# Patient Record
Sex: MALE | Race: WHITE | Employment: OTHER | URBAN - METROPOLITAN AREA
[De-identification: names, ages, dates, MRNs, and addresses within clinical notes are randomized per-mention and may not be internally consistent; named-entity substitution may affect disease eponyms.]

---

## 2017-02-21 ENCOUNTER — PRE VISIT (OUTPATIENT)
Dept: LAB | Facility: CLINIC | Age: 77
End: 2017-02-21

## 2017-02-21 NOTE — TELEPHONE ENCOUNTER
Was the patient contacted by phone and reminded of the upcoming visit? message left    Was the patient instructed to bring a current list of all medications to the appointment or instructed to bring in all medication bottles? Yes     Was the patient instructed to arrive prior to the appointment time to have ordered labs drawn? Message left informing pt that lab appointment was changed to 12:30 so results would be ready at time of visit.    Were the needed lab orders placed? Yes    HODA ESTEVES CMA

## 2017-02-27 ENCOUNTER — OFFICE VISIT (OUTPATIENT)
Dept: GASTROENTEROLOGY | Facility: CLINIC | Age: 77
End: 2017-02-27
Attending: INTERNAL MEDICINE
Payer: MEDICARE

## 2017-02-27 VITALS
RESPIRATION RATE: 18 BRPM | HEART RATE: 83 BPM | DIASTOLIC BLOOD PRESSURE: 77 MMHG | WEIGHT: 204 LBS | HEIGHT: 74 IN | SYSTOLIC BLOOD PRESSURE: 135 MMHG | BODY MASS INDEX: 26.18 KG/M2 | TEMPERATURE: 97.7 F | OXYGEN SATURATION: 97 %

## 2017-02-27 DIAGNOSIS — K83.01 PRIMARY SCLEROSING CHOLANGITIS (H): Primary | ICD-10-CM

## 2017-02-27 DIAGNOSIS — K83.01 PSC (PRIMARY SCLEROSING CHOLANGITIS) (H): ICD-10-CM

## 2017-02-27 LAB
ALBUMIN SERPL-MCNC: 4 G/DL (ref 3.4–5)
ALP SERPL-CCNC: 435 U/L (ref 40–150)
ALT SERPL W P-5'-P-CCNC: 62 U/L (ref 0–70)
ANION GAP SERPL CALCULATED.3IONS-SCNC: 8 MMOL/L (ref 3–14)
AST SERPL W P-5'-P-CCNC: 47 U/L (ref 0–45)
BILIRUB DIRECT SERPL-MCNC: 0.2 MG/DL (ref 0–0.2)
BILIRUB SERPL-MCNC: 0.8 MG/DL (ref 0.2–1.3)
BUN SERPL-MCNC: 17 MG/DL (ref 7–30)
CALCIUM SERPL-MCNC: 8.8 MG/DL (ref 8.5–10.1)
CHLORIDE SERPL-SCNC: 103 MMOL/L (ref 94–109)
CO2 SERPL-SCNC: 26 MMOL/L (ref 20–32)
CREAT SERPL-MCNC: 0.92 MG/DL (ref 0.66–1.25)
ERYTHROCYTE [DISTWIDTH] IN BLOOD BY AUTOMATED COUNT: 12.5 % (ref 10–15)
GFR SERPL CREATININE-BSD FRML MDRD: 80 ML/MIN/1.7M2
GLUCOSE SERPL-MCNC: 93 MG/DL (ref 70–99)
HCT VFR BLD AUTO: 48.9 % (ref 40–53)
HGB BLD-MCNC: 16.1 G/DL (ref 13.3–17.7)
INR PPP: 0.96 (ref 0.86–1.14)
MCH RBC QN AUTO: 29.5 PG (ref 26.5–33)
MCHC RBC AUTO-ENTMCNC: 32.9 G/DL (ref 31.5–36.5)
MCV RBC AUTO: 90 FL (ref 78–100)
PLATELET # BLD AUTO: 216 10E9/L (ref 150–450)
POTASSIUM SERPL-SCNC: 4.3 MMOL/L (ref 3.4–5.3)
PROT SERPL-MCNC: 8.2 G/DL (ref 6.8–8.8)
RBC # BLD AUTO: 5.45 10E12/L (ref 4.4–5.9)
SODIUM SERPL-SCNC: 138 MMOL/L (ref 133–144)
WBC # BLD AUTO: 6.5 10E9/L (ref 4–11)

## 2017-02-27 PROCEDURE — 36415 COLL VENOUS BLD VENIPUNCTURE: CPT | Performed by: INTERNAL MEDICINE

## 2017-02-27 PROCEDURE — 99212 OFFICE O/P EST SF 10 MIN: CPT | Mod: ZF

## 2017-02-27 PROCEDURE — 85610 PROTHROMBIN TIME: CPT | Performed by: INTERNAL MEDICINE

## 2017-02-27 PROCEDURE — 80048 BASIC METABOLIC PNL TOTAL CA: CPT | Performed by: INTERNAL MEDICINE

## 2017-02-27 PROCEDURE — 85027 COMPLETE CBC AUTOMATED: CPT | Performed by: INTERNAL MEDICINE

## 2017-02-27 PROCEDURE — 80076 HEPATIC FUNCTION PANEL: CPT | Performed by: INTERNAL MEDICINE

## 2017-02-27 ASSESSMENT — PAIN SCALES - GENERAL: PAINLEVEL: NO PAIN (0)

## 2017-02-27 NOTE — NURSING NOTE
"Chief Complaint   Patient presents with     RECHECK     PSC       Initial /77 (BP Location: Left arm, Patient Position: Chair, Cuff Size: Adult Regular)  Pulse 83  Temp 97.7  F (36.5  C) (Oral)  Resp 18  Ht 1.88 m (6' 2.02\")  Wt 92.5 kg (204 lb)  SpO2 97%  BMI 26.18 kg/m2 Estimated body mass index is 26.18 kg/(m^2) as calculated from the following:    Height as of this encounter: 1.88 m (6' 2.02\").    Weight as of this encounter: 92.5 kg (204 lb).  Medication Reconciliation: complete    "

## 2017-02-27 NOTE — MR AVS SNAPSHOT
"              After Visit Summary   2/27/2017    Dk Owens    MRN: [id number]           Patient Information     Date Of Birth          1940        Visit Information        Provider Department      2/27/2017 1:30 PM Ren Rodriguez MD [facility] Hepatology        Today's Diagnoses     Primary sclerosing cholangitis    -  1       Follow-ups after your visit        Follow-up notes from your care team     Return in about 1 year (around 2/27/2018).      Who to contact     If you have questions or need follow up information about today's clinic visit or your schedule please contact [facility]PAT[facility] directly at [phone].  Normal or non-critical lab and imaging results will be communicated to you by Dejamorhart, letter or phone within 4 business days after the clinic has received the results. If you do not hear from us within 7 days, please contact the clinic through Dejamorhart or phone. If you have a critical or abnormal lab result, we will notify you by phone as soon as possible.  Submit refill requests through NetScaler or call your pharmacy and they will forward the refill request to us. Please allow 3 business days for your refill to be completed.          Additional Information About Your Visit        MyChart Information     NetScaler gives you secure access to your electronic health record. If you see a primary care provider, you can also send messages to your care team and make appointments. If you have questions, please call your primary care clinic.  If you do not have a primary care provider, please call [phone] and they will assist you.        Care EveryWhere ID     This is your Care EveryWhere ID. This could be used by other organizations to access your [place] medical records  [id number]        Your Vitals Were     Pulse Temperature Respirations Height Pulse Oximetry BMI (Body Mass Index)    83 97.7  F (36.5  C) (Oral) 18 1.88 m (6' 2.02\") 97% 26.18 kg/m2       Blood Pressure from Last 3 " Encounters:   02/27/17 135/77   08/30/16 140/78   08/29/16 152/76    Weight from Last 3 Encounters:   02/27/17 92.5 kg (204 lb)   08/30/16 91.1 kg (200 lb 12.8 oz)   08/29/16 91.8 kg (202 lb 6.4 oz)               Primary Care Provider Office Phone # Fax #    John Norris [phone]       [facility] [address]  [facility] [address]        Thank you!     Thank you for choosing [facility]PAT[facility]  for your care. Our goal is always to provide you with excellent care. Hearing back from our patients is one way we can continue to improve our services. Please take a few minutes to complete the written survey that you may receive in the mail after your visit with us. Thank you!             Your Updated Medication List - Protect others around you: Learn how to safely use, store and throw away your medicines at www.disposemymeds.org.          This list is accurate as of: 2/27/17 11:59 PM.  Always use your most recent med list.                   Brand Name Dispense Instructions for use    ASPIRIN PO      Take 81 mg by mouth daily       MULTIVITAMIN ADULT PO      Take by mouth daily       OMEGA-3 FISH OIL PO      Take 700 mg daily       PLAVIX PO      Take 75 mg by mouth daily       RAMIPRIL PO      Take 10 mg by mouth daily

## 2017-02-27 NOTE — LETTER
2/27/2017      RE: Dk Owens  [address]  [facility] [address]       [facility]    Hepatology follow-up    Follow-up visit for PSC    Subjective:  76 year old male    PSC  - dx Jan 2016  - hx cholangitis secondary to biliary stricture Dec 2015  - abnormal ERCP Jan 2016  - last ERCP Mar 2016  - MRCP 8/29/16- normal liver, no CBD stricture    Patient presents to clinic this afternoon for follow-up of PSC.  He was last seen in clinic on 08/29/2016.  Since then, he saw [facility] Sierra [facility] on 08/13/2016, who agreed that watchful waiting approach was most appropriate.  The patient has discontinued atorvastatin of his own accord in 05/2016.  He denies any other medication changes, ER visits or hospital admissions since his last visit.      The patient is well.  He denies any signs or symptoms specific to liver disease.      The patient denies abdominal pain, itch, rash or muscle/joint pain.      The patient denies jaundice, lower extremity edema, abdominal distention, lethargy or confusion.      No history of abdominal cramps, diarrhea, constipation, hematochezia, hematemesis or melena.      The patient denies fevers, sweats or chills.  Weight has been stable.      The patient does not drink alcohol.       Medical hx Surgical hx   Past Medical History   Diagnosis Date     Coronary artery disease      Hyperlipidemia      Nephrolithiasis      Osteoarthritis      Primary sclerosing cholangitis      Prostate cancer (H) 2006      Past Surgical History   Procedure Laterality Date     H/o lithotripsy procedure       Endoscopic retrograde cholangiopancreatogram N/A 1/26/2016     Procedure: ENDOSCOPIC RETROGRADE CHOLANGIOPANCREATOGRAM;  Surgeon: Neal Sierra MD;  [facility]     Esophagoscopy, gastroscopy, duodenoscopy (egd), combined N/A 1/26/2016     Procedure: COMBINED ENDOSCOPIC ULTRASOUND, ESOPHAGOSCOPY, GASTROSCOPY, DUODENOSCOPY (EGD), FINE NEEDLE  "ASPIRATE/BIOPSY;  Surgeon: Neal Sierra MD;  [facility]     Endoscopic retrograde cholangiopancreatogram N/A 3/29/2016     Procedure: COMBINED ENDOSCOPIC RETROGRADE CHOLANGIOPANCREATOGRAPHY, REMOVE FOREIGN BODY OR STENT/TUBE;  Surgeon: Neal Sierra MD;  [facility]     Prostatectomy suprapubic  2006     Back surgery  1987          Medications  Prior to Admission medications    Medication Sig Start Date End Date Taking? Authorizing Provider   Multiple Vitamins-Minerals (MULTIVITAMIN ADULT PO) Take by mouth daily   Yes Reported, Patient   Clopidogrel Bisulfate (PLAVIX PO) Take 75 mg by mouth daily   Yes Reported, Patient   ASPIRIN PO Take 81 mg by mouth daily   Yes Reported, Patient   RAMIPRIL PO Take 10 mg by mouth daily   Yes Reported, Patient   Omega-3 Fatty Acids (OMEGA-3 FISH OIL PO) Take 700 mg daily   Yes Reported, Patient       Allergies  No Known Allergies    Review of systems  A 10-point review of systems was negative    Examination  /77 (BP Location: Left arm, Patient Position: Chair, Cuff Size: Adult Regular)  Pulse 83  Temp 97.7  F (36.5  C) (Oral)  Resp 18  Ht 1.88 m (6' 2.02\")  Wt 92.5 kg (204 lb)  SpO2 97%  BMI 26.18 kg/m2  Body mass index is 26.18 kg/(m^2).    Gen- well, NAD, A+Ox3, normal color  Lym- no palpable LAD  CVS- RRR  RS- CTA  Abd- soft, non-tender, no ascites or organomegaly on palpation or percussion, BS+  Extr-hands normal, no MEGAN  Skin- no rash or jaundice  Neuro- no asterixis  Psych- normal mood    Laboratory  Lab Results   Component Value Date     02/27/2017    POTASSIUM 4.3 02/27/2017    CHLORIDE 103 02/27/2017    CO2 26 02/27/2017    BUN 17 02/27/2017    CR 0.92 02/27/2017       Lab Results   Component Value Date    BILITOTAL 0.8 02/27/2017    ALT 62 02/27/2017    AST 47 02/27/2017    ALKPHOS 435 02/27/2017       Lab Results   Component Value Date    ALBUMIN 4.0 02/27/2017    PROTTOTAL 8.2 02/27/2017        Lab Results   Component Value Date "    WBC 6.5 02/27/2017    HGB 16.1 02/27/2017    MCV 90 02/27/2017     02/27/2017       Lab Results   Component Value Date    INR 0.96 02/27/2017       Radiology  MRCP 8/30/16 personally reviewed    Assessment  76-year-old male who presents for follow-up of primary sclerosing cholangitis.  MELD-Na= 6.  No evidence of itch or cholangitis.  Liver function tests stable.  No evidence of symptoms consistent with inflammatory bowel disease.  Will continue to manage expectantly through clinical observation and blood work particularly in the context of the patient's advanced age.     Plan  1.  Check CMP, INR, CBC in 6 months  2.  Colonoscopy for colorectal cancer screening as scheduled  3.  Follow-up in 12 months    Ren Juarez MD  Hepatology  [facility]

## 2017-02-27 NOTE — PROGRESS NOTES
[facility]    Hepatology follow-up    Follow-up visit for PSC    Subjective:  76 year old male    PSC  - dx Jan 2016  - hx cholangitis secondary to biliary stricture Dec 2015  - abnormal ERCP Jan 2016  - last ERCP Mar 2016  - MRCP 8/29/16- normal liver, no CBD stricture    Patient presents to clinic this afternoon for follow-up of PSC.  He was last seen in clinic on 08/29/2016.  Since then, he saw Dr. Sierra from [facility] on 08/13/2016, who agreed that watchful waiting approach was most appropriate.  The patient has discontinued atorvastatin of his own accord in 05/2016.  He denies any other medication changes, ER visits or hospital admissions since his last visit.      The patient is well.  He denies any signs or symptoms specific to liver disease.      The patient denies abdominal pain, itch, rash or muscle/joint pain.      The patient denies jaundice, lower extremity edema, abdominal distention, lethargy or confusion.      No history of abdominal cramps, diarrhea, constipation, hematochezia, hematemesis or melena.      The patient denies fevers, sweats or chills.  Weight has been stable.      The patient does not drink alcohol.       Medical hx Surgical hx   Past Medical History   Diagnosis Date     Coronary artery disease      Hyperlipidemia      Nephrolithiasis      Osteoarthritis      Primary sclerosing cholangitis      Prostate cancer (H) 2006      Past Surgical History   Procedure Laterality Date     H/o lithotripsy procedure       Endoscopic retrograde cholangiopancreatogram N/A 1/26/2016     Procedure: ENDOSCOPIC RETROGRADE CHOLANGIOPANCREATOGRAM;  Surgeon: Neal Sierra MD;  [facility]     Esophagoscopy, gastroscopy, duodenoscopy (egd), combined N/A 1/26/2016     Procedure: COMBINED ENDOSCOPIC ULTRASOUND, ESOPHAGOSCOPY, GASTROSCOPY, DUODENOSCOPY (EGD), FINE NEEDLE ASPIRATE/BIOPSY;  Surgeon: Neal Sierra MD;  [facility]     Endoscopic  "retrograde cholangiopancreatogram N/A 3/29/2016     Procedure: COMBINED ENDOSCOPIC RETROGRADE CHOLANGIOPANCREATOGRAPHY, REMOVE FOREIGN BODY OR STENT/TUBE;  Surgeon: Neal Sierra MD;  [facility]     Prostatectomy suprapubic  2006     Back surgery  1987          Medications  Prior to Admission medications    Medication Sig Start Date End Date Taking? Authorizing Provider   Multiple Vitamins-Minerals (MULTIVITAMIN ADULT PO) Take by mouth daily   Yes Reported, Patient   Clopidogrel Bisulfate (PLAVIX PO) Take 75 mg by mouth daily   Yes Reported, Patient   ASPIRIN PO Take 81 mg by mouth daily   Yes Reported, Patient   RAMIPRIL PO Take 10 mg by mouth daily   Yes Reported, Patient   Omega-3 Fatty Acids (OMEGA-3 FISH OIL PO) Take 700 mg daily   Yes Reported, Patient       Allergies  No Known Allergies    Review of systems  A 10-point review of systems was negative    Examination  /77 (BP Location: Left arm, Patient Position: Chair, Cuff Size: Adult Regular)  Pulse 83  Temp 97.7  F (36.5  C) (Oral)  Resp 18  Ht 1.88 m (6' 2.02\")  Wt 92.5 kg (204 lb)  SpO2 97%  BMI 26.18 kg/m2  Body mass index is 26.18 kg/(m^2).    Gen- well, NAD, A+Ox3, normal color  Lym- no palpable LAD  CVS- RRR  RS- CTA  Abd- soft, non-tender, no ascites or organomegaly on palpation or percussion, BS+  Extr-hands normal, no MEGAN  Skin- no rash or jaundice  Neuro- no asterixis  Psych- normal mood    Laboratory  Lab Results   Component Value Date     02/27/2017    POTASSIUM 4.3 02/27/2017    CHLORIDE 103 02/27/2017    CO2 26 02/27/2017    BUN 17 02/27/2017    CR 0.92 02/27/2017       Lab Results   Component Value Date    BILITOTAL 0.8 02/27/2017    ALT 62 02/27/2017    AST 47 02/27/2017    ALKPHOS 435 02/27/2017       Lab Results   Component Value Date    ALBUMIN 4.0 02/27/2017    PROTTOTAL 8.2 02/27/2017        Lab Results   Component Value Date    WBC 6.5 02/27/2017    HGB 16.1 02/27/2017    MCV 90 02/27/2017     02/27/2017 "       Lab Results   Component Value Date    INR 0.96 02/27/2017       Radiology  MRCP 8/30/16 personally reviewed    Assessment  76-year-old male who presents for follow-up of primary sclerosing cholangitis.  MELD-Na= 6.  No evidence of itch or cholangitis.  Liver function tests stable.  No evidence of symptoms consistent with inflammatory bowel disease.  Will continue to manage expectantly through clinical observation and blood work particularly in the context of the patient's advanced age.     Plan  1.  Check CMP, INR, CBC in 6 months  2.  Colonoscopy for colorectal cancer screening as scheduled  3.  Follow-up in 12 months    Ren Juarez MD  Hepatology  [facility]

## 2017-08-29 ENCOUNTER — TRANSFERRED RECORDS (OUTPATIENT)
Dept: HEALTH INFORMATION MANAGEMENT | Facility: CLINIC | Age: 77
End: 2017-08-29

## 2017-12-04 ENCOUNTER — HOSPITAL ENCOUNTER (EMERGENCY)
Dept: HOSPITAL 38 - CC.ED | Age: 77
Discharge: HOME | End: 2017-12-04
Payer: MEDICARE

## 2017-12-04 DIAGNOSIS — Z79.899: ICD-10-CM

## 2017-12-04 DIAGNOSIS — Z79.82: ICD-10-CM

## 2017-12-04 DIAGNOSIS — Z79.02: ICD-10-CM

## 2017-12-04 DIAGNOSIS — Z98.890: ICD-10-CM

## 2017-12-04 DIAGNOSIS — M54.5: Primary | ICD-10-CM

## 2017-12-04 DIAGNOSIS — R74.8: ICD-10-CM

## 2017-12-04 LAB
CHLORIDE SERPL-SCNC: 102 MEQ/L (ref 98–106)
SODIUM SERPL-SCNC: 138 MEQ/L (ref 136–145)

## 2017-12-04 PROCEDURE — 81001 URINALYSIS AUTO W/SCOPE: CPT

## 2017-12-04 PROCEDURE — 93005 ELECTROCARDIOGRAM TRACING: CPT

## 2017-12-04 PROCEDURE — 85730 THROMBOPLASTIN TIME PARTIAL: CPT

## 2017-12-04 PROCEDURE — 83615 LACTATE (LD) (LDH) ENZYME: CPT

## 2017-12-04 PROCEDURE — 85610 PROTHROMBIN TIME: CPT

## 2017-12-04 PROCEDURE — 99284 EMERGENCY DEPT VISIT MOD MDM: CPT

## 2017-12-04 PROCEDURE — 36415 COLL VENOUS BLD VENIPUNCTURE: CPT

## 2017-12-04 PROCEDURE — 84484 ASSAY OF TROPONIN QUANT: CPT

## 2017-12-04 PROCEDURE — 80053 COMPREHEN METABOLIC PANEL: CPT

## 2017-12-04 PROCEDURE — 82550 ASSAY OF CK (CPK): CPT

## 2017-12-04 PROCEDURE — 71020: CPT

## 2017-12-04 PROCEDURE — 85025 COMPLETE CBC W/AUTO DIFF WBC: CPT

## 2017-12-04 NOTE — EDM.PDOC
ED HPI GENERAL MEDICAL PROBLEM





- General


Chief Complaint: Back Pain or Injury


Stated Complaint: GENERAL PAINS


Time Seen by Provider: 12/04/17 16:10


Source of Information: Reports: Patient


History Limitations: Reports: No Limitations





- History of Present Illness


INITIAL COMMENTS - FREE TEXT/NARRATIVE: 


Alvaro is a 77 year old male who presents to the ER with complaints of lower back 

pain. 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 

office desk, he has sudden onset of lower back pain. He was concerned, as he 

has a heart attack 7 years ago in which his main symptom was back pain. He came 

to the ER to make sure it wasn't his heart. He describes the pain as sharp, 

pressure like in nature. He rates the pain a 3/10. He denies any dizziness, 

lightheadedness, nausea, vomiting, diarrhea, chest pain, shortness of breath, 

numbness, tingling, dysuria. Does report some urinary frequency. No other 

associated symptoms.  Has not identified any alleviating or aggravating 

factors. He reports he did take 2 tums prior to ER presentation, but is unsure 

if that helped at all. He does report a history of back surgery. Denies any 

numbness or tingling of his extremities. Denies any injury to the back. 





He reports a history of MI about 7 years ago, in which he had one stent placed. 

He denies every feeling the typical chest pain with his last MI, rather he 

reports he had back pain. He reports he takes a baby aspirin daily. He states 

this feels similar to the last time he has an MI so he just wanted to make sure 

it wasn't his heart. 





Onset: Today


Onset Date: 12/04/17


Onset Time: 14:30


Duration: Improving


Location: Reports: Back


Quality: Reports: Ache, Sharp


Severity: Mild


Improves with: Reports: None


Treatments PTA: Reports: Other (see below)


Other Treatments PTA: TUMS


  ** Bilateral Middle Back


Pain Score (Numeric/FACES): 3





- Related Data


 Allergies











Allergy/AdvReac Type Severity Reaction Status Date / Time


 


No Known Allergies Allergy   Verified 12/04/17 16:19











Home Meds: 


 Home Meds





Aspirin [Children's Aspirin] 81 mg PO DAILY 12/04/17 [History]


Clopidogrel Bisulfate [Clopidogrel] 75 mg PO DAILY 12/04/17 [History]


Multivitamin [Multi-Day Vitamins] 1 each PO DAILY 12/04/17 [History]


Omega-3S/DHA/Epa/Fish Oil [Fish Oil Omega-3 Softgel] 3 each PO DAILY 12/04/17 [

History]


Ramipril [Ramipril] 10 mg PO DAILY 12/04/17 [History]


atorvaSTATin [Lipitor] 40 mg PO ONETIME 12/04/17 [History]











Past Medical History





- Past Surgical History


Other Musculoskeletal Surgeries/Procedures:: back surgery





ED ROS GENERAL





- Review of Systems


Review Of Systems: See Below


Constitutional: Reports: No Symptoms.  Denies: Fever, Chills, Weakness, Fatigue

, Diaphoresis


HEENT: Reports: No Symptoms.  Denies: Ear Pain, Rhinitis, Sinus Problem, Vertigo

, Vision Change


Respiratory: Reports: No Symptoms.  Denies: Shortness of Breath, Wheezing, 

Pleuritic Chest Pain, Cough, Sputum, Hemoptysis


Cardiovascular: Denies: Chest Pain, Blood Pressure Problem, Dyspnea on Exertion

, Edema, Lightheadedness, Palpitations, Syncope


Endocrine: Reports: No Symptoms


GI/Abdominal: Reports: No Symptoms.  Denies: Abdominal Pain, Anorexia, Black 

Stool, Bloody Stool, Diarrhea, Decreased Appetite, Difficulty Swallowing, 

Hematemesis, Hematochezia, Melena, Nausea, Vomiting


: Reports: Frequency.  Denies: Dysuria, Flank Pain, Hematuria, Urgency, 

Urinary Retention


Musculoskeletal: Reports: Back Pain (lower bilateral back pain, sudden onset)


Skin: Reports: No Symptoms.  Denies: Diaphoresis, Change in Color


Neurological: Reports: No Symptoms.  Denies: Confusion, Dizziness, Headache, 

Numbness, Syncope, Tingling, Weakness, Change in Speech, Gait Disturbance


Psychiatric: Reports: No Symptoms


Hematologic/Lymphatic: Reports: No Symptoms


Immunologic: Reports: No Symptoms





ED EXAM, GENERAL





- Physical Exam


Exam: See Below


Exam Limited By: No Limitations


General Appearance: Alert, WD/WN, No Apparent Distress


Eye Exam: Bilateral Eye: EOMI, Normal Fundi, Normal Inspection, PERRL


Head: Atraumatic, Normocephalic


Neck: Normal Inspection, Supple, Non-Tender, Full Range of Motion


Respiratory/Chest: No Respiratory Distress, Lungs Clear, Normal Breath Sounds, 

No Accessory Muscle Use, Chest Non-Tender


Cardiovascular: Normal Peripheral Pulses, Regular Rate, Rhythm, No Edema, No 

Gallop, No JVD, No Murmur, No Rub


GI/Abdominal: Normal Bowel Sounds, Soft, Non-Tender, No Organomegaly, No 

Distention, No Abnormal Bruit, No Mass


 (Male) Exam: Deferred


Rectal (Males) Exam: Deferred


Back Exam: Normal Inspection, Full Range of Motion.  No: CVA Tenderness (L), 

CVA Tenderness (R), Decreased Range of Motion, Paraspinal Tenderness, Vertebral 

Tenderness


Extremities: Normal Inspection, Normal Range of Motion, Non-Tender, Normal 

Capillary Refill, No Pedal Edema


Neurological: Alert, Oriented, CN II-XII Intact, Normal Cognition, Normal Gait, 

Normal Reflexes, No Motor/Sensory Deficits


Psychiatric: Normal Affect, Normal Mood


Skin Exam: Warm, Dry, Intact, Normal Color, No Rash


Lymphatic: No Adenopathy





EKG INTERPRETATION


EKG Date: 12/04/17


Rhythm: NSR


Axis: Normal


P-Wave: Present


QRS: Normal


ST-T: Normal


QT: Normal


Comparison: NA - No Prior EKG





Course





- Vital Signs


Last Recorded V/S: 


 Last Vital Signs











Temp  97.6 F   12/04/17 15:58


 


Pulse  69   12/04/17 15:58


 


Resp  18   12/04/17 15:58


 


BP  145/75 H  12/04/17 15:58


 


Pulse Ox  97   12/04/17 15:58














- Orders/Labs/Meds


Orders: 


 Active Orders 24 hr











 Category Date Time Status


 


 EKG Documentation Completion [RC] STAT Care  12/04/17 16:15 Active


 


 Chest 2V [CR] Stat Exams  12/04/17 16:25 Taken











Labs: 


 Laboratory Tests











  12/04/17 12/04/17 12/04/17 Range/Units





  16:32 16:32 16:32 


 


WBC  5.4    (5.0-10.0)  10^3/uL


 


RBC  4.45 L    (4.50-6.00)  10^6/uL


 


Hgb  13.5 L    (14.0-18.0)  g/dL


 


Hct  40.2    (40.0-54.0)  %


 


MCV  90.3    (82.0-94.0)  fL


 


MCH  30.3    (27.0-32.0)  pg


 


MCHC  33.6    (33.0-38.0)  g/dL


 


RDW Coeff of Shira  12.5    (11.0-15.0)  %


 


Plt Count  207    (150-400)  10^3/uL


 


Neut % (Auto)  48.5    (35-85)  %


 


Lymph % (Auto)  33.3    (10-55)  %


 


Mono % (Auto)  12.4    (0-16)  %


 


Eos % (Auto)  5.4 H    (0-5)  %


 


Baso % (Auto)  0.4    (0-3)  %


 


Neut # (Auto)  2.63    (1.80-7.00)  10^3/uL


 


Lymph # (Auto)  1.80    (1.00-4.80)  10^3/uL


 


Mono # (Auto)  0.67    (0.00-0.80)  10^3/uL


 


Eos # (Auto)  0.29    (0.00-0.45)  10^3/uL


 


Baso # (Auto)  0.02    10^3/uL


 


PT   10.8   (9.7-12.3)  SEC


 


INR   1.00   (0.92-1.18)  


 


APTT   26.4   (24.5-30.9)  SEC


 


Sodium    138  (136-145)  mEq/L


 


Potassium    4.1  (3.5-5.0)  mEq/L


 


Chloride    102  ()  mEq/L


 


Carbon Dioxide    29  (21-32)  mmol/L


 


BUN    17  (7-18)  mg/dL


 


Creatinine    1.2  (0.7-1.3)  mg/dL


 


Est Cr Clr Drug Dosing    60.89  mL/min


 


Estimated GFR (MDRD)    59 L  (>=60)  mL/min


 


Glucose    100 H  (75-99)  mg/dL


 


Calcium    9.2  (8.4-10.1)  mg/dL


 


Total Bilirubin    0.6  (0.0-1.0)  mg/dL


 


AST    81 H  (15-37)  U/L


 


ALT    116 H  (12-78)  U/L


 


Alkaline Phosphatase    726 H  ()  U/L


 


Lactate Dehydrogenase    147  (100-190)  U/L


 


Creatine Kinase    130  ()  U/L


 


Troponin I    < 0.017  (0.00-0.06)  ng/mL


 


Total Protein    7.5  (6.4-8.2)  g/dL


 


Albumin    3.5  (3.4-5.0)  g/dL


 


Urine Color     (YELLOW)  


 


Urine Appearance     (CLEAR)  


 


Urine pH     (4.5-8.0)  


 


Ur Specific Gravity     (1.003-1.020)  


 


Urine Protein     (NEGATIVE)  mg/dL


 


Urine Glucose (UA)     (NEGATIVE)  mg/dL


 


Urine Ketones     (NEGATIVE)  mg/dL


 


Urine Occult Blood     (NEGATIVE)  


 


Urine Nitrite     (NEGATIVE)  


 


Urine Bilirubin     (NEGATIVE)  


 


Urine Urobilinogen     (0.2-1.0)  EU/dL


 


Ur Leukocyte Esterase     (NEGATIVE)  


 


Urine RBC     (0-5)  /HPF


 


Urine WBC     (0-5)  /HPF














  12/04/17 Range/Units





  16:50 


 


WBC   (5.0-10.0)  10^3/uL


 


RBC   (4.50-6.00)  10^6/uL


 


Hgb   (14.0-18.0)  g/dL


 


Hct   (40.0-54.0)  %


 


MCV   (82.0-94.0)  fL


 


MCH   (27.0-32.0)  pg


 


MCHC   (33.0-38.0)  g/dL


 


RDW Coeff of Shira   (11.0-15.0)  %


 


Plt Count   (150-400)  10^3/uL


 


Neut % (Auto)   (35-85)  %


 


Lymph % (Auto)   (10-55)  %


 


Mono % (Auto)   (0-16)  %


 


Eos % (Auto)   (0-5)  %


 


Baso % (Auto)   (0-3)  %


 


Neut # (Auto)   (1.80-7.00)  10^3/uL


 


Lymph # (Auto)   (1.00-4.80)  10^3/uL


 


Mono # (Auto)   (0.00-0.80)  10^3/uL


 


Eos # (Auto)   (0.00-0.45)  10^3/uL


 


Baso # (Auto)   10^3/uL


 


PT   (9.7-12.3)  SEC


 


INR   (0.92-1.18)  


 


APTT   (24.5-30.9)  SEC


 


Sodium   (136-145)  mEq/L


 


Potassium   (3.5-5.0)  mEq/L


 


Chloride   ()  mEq/L


 


Carbon Dioxide   (21-32)  mmol/L


 


BUN   (7-18)  mg/dL


 


Creatinine   (0.7-1.3)  mg/dL


 


Est Cr Clr Drug Dosing   mL/min


 


Estimated GFR (MDRD)   (>=60)  mL/min


 


Glucose   (75-99)  mg/dL


 


Calcium   (8.4-10.1)  mg/dL


 


Total Bilirubin   (0.0-1.0)  mg/dL


 


AST   (15-37)  U/L


 


ALT   (12-78)  U/L


 


Alkaline Phosphatase   ()  U/L


 


Lactate Dehydrogenase   (100-190)  U/L


 


Creatine Kinase   ()  U/L


 


Troponin I   (0.00-0.06)  ng/mL


 


Total Protein   (6.4-8.2)  g/dL


 


Albumin   (3.4-5.0)  g/dL


 


Urine Color  Yellow  (YELLOW)  


 


Urine Appearance  Clear  (CLEAR)  


 


Urine pH  7.0  (4.5-8.0)  


 


Ur Specific Gravity  1.015  (1.003-1.020)  


 


Urine Protein  Negative  (NEGATIVE)  mg/dL


 


Urine Glucose (UA)  Negative  (NEGATIVE)  mg/dL


 


Urine Ketones  Negative  (NEGATIVE)  mg/dL


 


Urine Occult Blood  Negative  (NEGATIVE)  


 


Urine Nitrite  Negative  (NEGATIVE)  


 


Urine Bilirubin  Negative  (NEGATIVE)  


 


Urine Urobilinogen  0.2  (0.2-1.0)  EU/dL


 


Ur Leukocyte Esterase  Negative  (NEGATIVE)  


 


Urine RBC  Not seen  (0-5)  /HPF


 


Urine WBC  Not seen  (0-5)  /HPF











Meds: 


Medications














Discontinued Medications














Generic Name Dose Route Start Last Admin





  Trade Name Freq  PRN Reason Stop Dose Admin


 


Aspirin  324 mg  12/04/17 16:25  12/04/17 16:25





  Aspirin  PO  12/04/17 16:26  324 mg





  ONETIME ONE   Administration














- Re-Assessments/Exams


Free Text/Narrative Re-Assessment/Exam: 





Discussed labs, CXR, and EKG findings with patient. All labs normal, except 

liver enzymes and alkaline phosphatase which are chronically elevated. Patient 

has been following with GI at [facility] for this. He currently denies 

any abdominal pain, N/V/D. He does not appear jaundice. He reports he has had 

multiple labs and procedures done in the past to determine what is going on 

with his liver but they are still unsure. Hepatitis panels have all been 

negative in the past. 





Patient reports pain has pretty much resolved and he is relieved to know it is 

not his heart. 








Departure





- Departure


Time of Disposition: 17:33


Disposition: Home, Self-Care 01


Condition: Good


Clinical Impression: 


 Elevated liver enzymes





Back pain


Qualifiers:


 Back pain location: low back pain Chronicity: acute Back pain laterality: 

bilateral Sciatica presence: without sciatica Qualified Code(s): M54.5 - Low 

back pain





Instructions:  Liver Function Tests


Referrals: 


Ceci Hawley NP [Emergency Provider] - 


Forms:  ED Department Discharge


Additional Instructions: 


Follow up in clinic in 1 week


Return to ER or notify provider if onset of nausea, vomiting, worsening pain, 

fever, or chills





- My Orders


Last 24 Hours: 


My Active Orders





12/04/17 16:15


EKG Documentation Completion [RC] STAT 





12/04/17 16:25


Chest 2V [CR] Stat 














- Assessment/Plan


Last 24 Hours: 


My Active Orders





12/04/17 16:15


EKG Documentation Completion [RC] STAT 





12/04/17 16:25


Chest 2V [CR] Stat

## 2018-04-06 DIAGNOSIS — K83.01 PRIMARY SCLEROSING CHOLANGITIS (H): Primary | ICD-10-CM

## 2018-04-09 ENCOUNTER — OFFICE VISIT (OUTPATIENT)
Dept: GASTROENTEROLOGY | Facility: CLINIC | Age: 78
End: 2018-04-09
Attending: INTERNAL MEDICINE
Payer: MEDICARE

## 2018-04-09 VITALS
WEIGHT: 200.8 LBS | SYSTOLIC BLOOD PRESSURE: 135 MMHG | RESPIRATION RATE: 18 BRPM | DIASTOLIC BLOOD PRESSURE: 73 MMHG | OXYGEN SATURATION: 96 % | HEART RATE: 73 BPM | TEMPERATURE: 97.8 F | HEIGHT: 74 IN | BODY MASS INDEX: 25.77 KG/M2

## 2018-04-09 DIAGNOSIS — K83.01 PRIMARY SCLEROSING CHOLANGITIS (H): ICD-10-CM

## 2018-04-09 DIAGNOSIS — R74.8 BLOOD ALKALINE PHOSPHATASE INCREASED COMPARED WITH PRIOR MEASUREMENT: ICD-10-CM

## 2018-04-09 DIAGNOSIS — K83.01 PSC (PRIMARY SCLEROSING CHOLANGITIS) (H): Primary | ICD-10-CM

## 2018-04-09 LAB
ALBUMIN SERPL-MCNC: 3.5 G/DL (ref 3.4–5)
ALP SERPL-CCNC: 734 U/L (ref 40–150)
ALT SERPL W P-5'-P-CCNC: 70 U/L (ref 0–70)
ANION GAP SERPL CALCULATED.3IONS-SCNC: 7 MMOL/L (ref 3–14)
AST SERPL W P-5'-P-CCNC: 68 U/L (ref 0–45)
BILIRUB DIRECT SERPL-MCNC: 0.2 MG/DL (ref 0–0.2)
BILIRUB SERPL-MCNC: 0.8 MG/DL (ref 0.2–1.3)
BUN SERPL-MCNC: 17 MG/DL (ref 7–30)
CALCIUM SERPL-MCNC: 9.1 MG/DL (ref 8.5–10.1)
CHLORIDE SERPL-SCNC: 106 MMOL/L (ref 94–109)
CO2 SERPL-SCNC: 26 MMOL/L (ref 20–32)
CREAT SERPL-MCNC: 0.77 MG/DL (ref 0.66–1.25)
ERYTHROCYTE [DISTWIDTH] IN BLOOD BY AUTOMATED COUNT: 12.6 % (ref 10–15)
GFR SERPL CREATININE-BSD FRML MDRD: >90 ML/MIN/1.7M2
GLUCOSE SERPL-MCNC: 88 MG/DL (ref 70–99)
HCT VFR BLD AUTO: 43.5 % (ref 40–53)
HGB BLD-MCNC: 14.3 G/DL (ref 13.3–17.7)
INR PPP: 1.02 (ref 0.86–1.14)
MCH RBC QN AUTO: 30.4 PG (ref 26.5–33)
MCHC RBC AUTO-ENTMCNC: 32.9 G/DL (ref 31.5–36.5)
MCV RBC AUTO: 92 FL (ref 78–100)
PLATELET # BLD AUTO: 240 10E9/L (ref 150–450)
POTASSIUM SERPL-SCNC: 4 MMOL/L (ref 3.4–5.3)
PROT SERPL-MCNC: 8 G/DL (ref 6.8–8.8)
RBC # BLD AUTO: 4.71 10E12/L (ref 4.4–5.9)
SODIUM SERPL-SCNC: 138 MMOL/L (ref 133–144)
WBC # BLD AUTO: 6.9 10E9/L (ref 4–11)

## 2018-04-09 PROCEDURE — 85610 PROTHROMBIN TIME: CPT | Performed by: INTERNAL MEDICINE

## 2018-04-09 PROCEDURE — 36415 COLL VENOUS BLD VENIPUNCTURE: CPT | Performed by: INTERNAL MEDICINE

## 2018-04-09 PROCEDURE — 85027 COMPLETE CBC AUTOMATED: CPT | Performed by: INTERNAL MEDICINE

## 2018-04-09 PROCEDURE — 80076 HEPATIC FUNCTION PANEL: CPT | Performed by: INTERNAL MEDICINE

## 2018-04-09 PROCEDURE — 80048 BASIC METABOLIC PNL TOTAL CA: CPT | Performed by: INTERNAL MEDICINE

## 2018-04-09 PROCEDURE — G0463 HOSPITAL OUTPT CLINIC VISIT: HCPCS | Mod: ZF

## 2018-04-09 RX ORDER — ATORVASTATIN CALCIUM 40 MG/1
40 TABLET, FILM COATED ORAL DAILY
COMMUNITY
Start: 2017-06-01

## 2018-04-09 ASSESSMENT — PAIN SCALES - GENERAL: PAINLEVEL: NO PAIN (0)

## 2018-04-09 NOTE — LETTER
4/9/2018      RE: [address]  [address]   [facility] [id number]       [facility]    Hepatology follow-up    Follow-up visit for PSC    Subjective:  77 year old male    PSC  - dx Jan 2016  - hx cholangitis secondary to biliary stricture Dec 2015  - abnormal ERCP Jan 2016  - last ERCP Mar 2016  - MRCP 8/29/16- normal liver, no CBD stricture    Patient comes to clinic this afternoon for follow-up of PSC.  Last clinic visit was in Feb 2017.  Since then, he had an elective left inguinal hernia repair in Sep 2017.  He was also in his local ER with what turned out to be non-cardiac chest pain.  He restarted atorvastatin on the advice of his cardiologist.  He has not been admitted to hospital since last visit.    Patient is well today.  He denies any signs or symptoms specific to liver disease.    Patient denies itch, rash, jaundice, lower extremity edema, abdominal distension, lethargy or confusion.    Patient denies melena, hematemesis or hematochezia.    Patient denies fevers, sweats or chills.  Weight stable.    Patient does not drink alcohol.  His daughter is currently buying a house in [place], [place].      Medical hx Surgical hx   Past Medical History:   Diagnosis Date     Coronary artery disease      Hyperlipidemia      Nephrolithiasis      Osteoarthritis      Primary sclerosing cholangitis      Prostate cancer (H) 2006      Past Surgical History:   Procedure Laterality Date     BACK SURGERY  1987     ENDOSCOPIC RETROGRADE CHOLANGIOPANCREATOGRAM N/A 1/26/2016    Procedure: ENDOSCOPIC RETROGRADE CHOLANGIOPANCREATOGRAM;  Surgeon: Neal Sierra MD;  Location: [facility] OR     ENDOSCOPIC RETROGRADE CHOLANGIOPANCREATOGRAM N/A 3/29/2016    Procedure: COMBINED ENDOSCOPIC RETROGRADE CHOLANGIOPANCREATOGRAPHY, REMOVE FOREIGN BODY OR STENT/TUBE;  Surgeon: Neal Sierra MD;  Location: [facility] OR     ESOPHAGOSCOPY, GASTROSCOPY, DUODENOSCOPY (EGD), COMBINED N/A 1/26/2016    Procedure: COMBINED  "ENDOSCOPIC ULTRASOUND, ESOPHAGOSCOPY, GASTROSCOPY, DUODENOSCOPY (EGD), FINE NEEDLE ASPIRATE/BIOPSY;  Surgeon: Neal Sierra MD;  Location: [facility] OR     h/o lithotripsy procedure       PROSTATECTOMY SUPRAPUBIC  2006          Medications  Prior to Admission medications    Medication Sig Start Date End Date Taking? Authorizing Provider   atorvastatin (LIPITOR) 40 MG tablet Take 40 mg by mouth daily 6/1/17  Yes Reported, Patient   Multiple Vitamins-Minerals (MULTIVITAMIN ADULT PO) Take by mouth daily   Yes Reported, Patient   Clopidogrel Bisulfate (PLAVIX PO) Take 75 mg by mouth daily   Yes Reported, Patient   ASPIRIN PO Take 81 mg by mouth daily   Yes Reported, Patient   RAMIPRIL PO Take 10 mg by mouth daily   Yes Reported, Patient   Omega-3 Fatty Acids (OMEGA-3 FISH OIL PO) Take 700 mg daily   Yes Reported, Patient       Allergies  No Known Allergies    Review of systems  A 10-point review of systems was negative    Examination  /73 (BP Location: Right arm, Patient Position: Sitting, Cuff Size: Adult Large)  Pulse 73  Temp 97.8  F (36.6  C) (Oral)  Resp 18  Ht 1.88 m (6' 2\")  Wt 91.1 kg (200 lb 12.8 oz)  SpO2 96%  BMI 25.78 kg/m2  Body mass index is 25.78 kg/(m^2).    Gen- well, NAD, A+Ox3, normal color  CVS- RRR  RS- CTA  Abd- soft, non-tender, small reducible umbilical hernia, palpable liver edge, no ascites or organomegaly on palpation or percussion, BS+  Extr- hands normal, no MEGAN  Skin- no rash or jaundice  Neuro- no asterixis  Psych- normal mood    Laboratory  Lab Results   Component Value Date     04/09/2018    POTASSIUM 4.0 04/09/2018    CHLORIDE 106 04/09/2018    CO2 26 04/09/2018    BUN 17 04/09/2018    CR 0.77 04/09/2018       Lab Results   Component Value Date    BILITOTAL 0.8 04/09/2018    ALT 70 04/09/2018    AST 68 04/09/2018    ALKPHOS 734 04/09/2018       Lab Results   Component Value Date    ALBUMIN 3.5 04/09/2018    PROTTOTAL 8.0 04/09/2018        Lab Results   Component " Value Date    WBC 6.9 04/09/2018    HGB 14.3 04/09/2018    MCV 92 04/09/2018     04/09/2018       Lab Results   Component Value Date    INR 1.02 04/09/2018       Radiology  Nil recent    Assessment  77 year old male who presents for routine follow-up of primary sclerosing cholangitis.  No new symptoms to suggest progression of disease.  No clinical or biochemical evidence of cirrhosis.  Will obtain MRCP to assess significance of elevated alkaline phosphatase.  If MRCP shows a dominant stricture, would refer for ERCP with cholangioscopy to rule out malignancy.    Plan  1.  MRCP  2.  Monitor LFTs- will determine interval after MRCP  3.  Follow-up in 12 months    Ren Juarez MD  Hepatology  [facility]    Ren Juarez MD

## 2018-04-09 NOTE — PROGRESS NOTES
[facility]    Hepatology follow-up    Follow-up visit for PSC    Subjective:  77 year old male    PSC  - dx Jan 2016  - hx cholangitis secondary to biliary stricture Dec 2015  - abnormal ERCP Jan 2016  - last ERCP Mar 2016  - MRCP 8/29/16- normal liver, no CBD stricture    Patient comes to clinic this afternoon for follow-up of PSC.  Last clinic visit was in Feb 2017.  Since then, he had an elective left inguinal hernia repair in Sep 2017.  He was also in his local ER with what turned out to be non-cardiac chest pain.  He restarted atorvastatin on the advice of his cardiologist.  He has not been admitted to hospital since last visit.    Patient is well today.  He denies any signs or symptoms specific to liver disease.    Patient denies itch, rash, jaundice, lower extremity edema, abdominal distension, lethargy or confusion.    Patient denies melena, hematemesis or hematochezia.    Patient denies fevers, sweats or chills.  Weight stable.    Patient does not drink alcohol.  His daughter is currently buying a house in [place], [place].      Medical hx Surgical hx   Past Medical History:   Diagnosis Date     Coronary artery disease      Hyperlipidemia      Nephrolithiasis      Osteoarthritis      Primary sclerosing cholangitis      Prostate cancer (H) 2006      Past Surgical History:   Procedure Laterality Date     BACK SURGERY  1987     ENDOSCOPIC RETROGRADE CHOLANGIOPANCREATOGRAM N/A 1/26/2016    Procedure: ENDOSCOPIC RETROGRADE CHOLANGIOPANCREATOGRAM;  Surgeon: Neal Sierra MD;  Location:  OR     ENDOSCOPIC RETROGRADE CHOLANGIOPANCREATOGRAM N/A 3/29/2016    Procedure: COMBINED ENDOSCOPIC RETROGRADE CHOLANGIOPANCREATOGRAPHY, REMOVE FOREIGN BODY OR STENT/TUBE;  Surgeon: Neal Sierra MD;  [facility]     ESOPHAGOSCOPY, GASTROSCOPY, DUODENOSCOPY (EGD), COMBINED N/A 1/26/2016    Procedure: COMBINED ENDOSCOPIC ULTRASOUND, ESOPHAGOSCOPY, GASTROSCOPY, DUODENOSCOPY (EGD),  "FINE NEEDLE ASPIRATE/BIOPSY;  Surgeon: Neal Sierra MD;  Location: [facility] OR     h/o lithotripsy procedure       PROSTATECTOMY SUPRAPUBIC  2006          Medications  Prior to Admission medications    Medication Sig Start Date End Date Taking? Authorizing Provider   atorvastatin (LIPITOR) 40 MG tablet Take 40 mg by mouth daily 6/1/17  Yes Reported, Patient   Multiple Vitamins-Minerals (MULTIVITAMIN ADULT PO) Take by mouth daily   Yes Reported, Patient   Clopidogrel Bisulfate (PLAVIX PO) Take 75 mg by mouth daily   Yes Reported, Patient   ASPIRIN PO Take 81 mg by mouth daily   Yes Reported, Patient   RAMIPRIL PO Take 10 mg by mouth daily   Yes Reported, Patient   Omega-3 Fatty Acids (OMEGA-3 FISH OIL PO) Take 700 mg daily   Yes Reported, Patient       Allergies  No Known Allergies    Review of systems  A 10-point review of systems was negative    Examination  /73 (BP Location: Right arm, Patient Position: Sitting, Cuff Size: Adult Large)  Pulse 73  Temp 97.8  F (36.6  C) (Oral)  Resp 18  Ht 1.88 m (6' 2\")  Wt 91.1 kg (200 lb 12.8 oz)  SpO2 96%  BMI 25.78 kg/m2  Body mass index is 25.78 kg/(m^2).    Gen- well, NAD, A+Ox3, normal color  CVS- RRR  RS- CTA  Abd- soft, non-tender, small reducible umbilical hernia, palpable liver edge, no ascites or organomegaly on palpation or percussion, BS+  Extr- hands normal, no MEGAN  Skin- no rash or jaundice  Neuro- no asterixis  Psych- normal mood    Laboratory  Lab Results   Component Value Date     04/09/2018    POTASSIUM 4.0 04/09/2018    CHLORIDE 106 04/09/2018    CO2 26 04/09/2018    BUN 17 04/09/2018    CR 0.77 04/09/2018       Lab Results   Component Value Date    BILITOTAL 0.8 04/09/2018    ALT 70 04/09/2018    AST 68 04/09/2018    ALKPHOS 734 04/09/2018       Lab Results   Component Value Date    ALBUMIN 3.5 04/09/2018    PROTTOTAL 8.0 04/09/2018        Lab Results   Component Value Date    WBC 6.9 04/09/2018    HGB 14.3 04/09/2018    MCV 92 " 04/09/2018     04/09/2018       Lab Results   Component Value Date    INR 1.02 04/09/2018       Radiology  Nil recent    Assessment  77 year old male who presents for routine follow-up of primary sclerosing cholangitis.  No new symptoms to suggest progression of disease.  No clinical or biochemical evidence of cirrhosis.  Will obtain MRCP to assess significance of elevated alkaline phosphatase.  If MRCP shows a dominant stricture, would refer for ERCP with cholangioscopy to rule out malignancy.    Plan  1.  MRCP  2.  Monitor LFTs- will determine interval after MRCP  3.  Follow-up in 12 months    Ren Juarez MD  Hepatology  [facility]

## 2018-04-09 NOTE — MR AVS SNAPSHOT
After Visit Summary   4/9/2018    Dk Owens    MRN: [id number]           Patient Information     Date Of Birth          1940        Visit Information        Provider Department      4/9/2018 2:00 PM Ren Rodriguez MD [facility]        Today's Diagnoses     PSC (primary sclerosing cholangitis)    -  1    Blood alkaline phosphatase increased compared with prior measurement           Follow-ups after your visit        Follow-up notes from your care team     Return in about 1 year (around 4/9/2019).      Your next 10 appointments already scheduled     Apr 09, 2018  2:00 PM CDT   (Arrive by 1:45 PM)   Return General Liver with Ren Hazel MD   [facility] ([facility])    [address]  [address]  [place] [address] [phone]            Apr 08, 2019 12:30 PM CDT   Lab with  LAB   [facility] ([facility])    [address]  1st Floor  [place] [id number] [phone]            Apr 08, 2019  1:30 PM CDT   (Arrive by 1:15 PM)   Return General Liver with Ren Hazel MD   [facility] ([facility])    [address]  [address]  [place] [id number] [phone]              Who to contact     If you have questions or need follow up information about today's clinic visit or your schedule please contact [facility]PATOLOG[facility] directly at [phone].  Normal or non-critical lab and imaging results will be communicated to you by MyChart, letter or phone within 4 business days after the clinic has received the results. If you do not hear from us within 7 days, please contact the clinic through MyChart or phone. If you have a critical or abnormal lab result, we will notify you by phone as soon as possible.  Submit refill requests through [employer] or call your pharmacy and they will forward the refill request to us. Please allow 3  "business days for your refill to be completed.          Additional Information About Your Visit        MyChart Information     [employer] gives you secure access to your electronic health record. If you see a primary care provider, you can also send messages to your care team and make appointments. If you have questions, please call your primary care clinic.  If you do not have a primary care provider, please call [phone] and they will assist you.        Care EveryWhere ID     This is your Care EveryWhere ID. This could be used by other organizations to access your [place] medical records  [id number]        Your Vitals Were     Pulse Temperature Respirations Height Pulse Oximetry BMI (Body Mass Index)    73 97.8  F (36.6  C) (Oral) 18 1.88 m (6' 2\") 96% 25.78 kg/m2       Blood Pressure from Last 3 Encounters:   04/09/18 135/73   02/27/17 135/77   08/30/16 140/78    Weight from Last 3 Encounters:   04/09/18 91.1 kg (200 lb 12.8 oz)   02/27/17 92.5 kg (204 lb)   08/30/16 91.1 kg (200 lb 12.8 oz)               Primary Care Provider Office Phone # Fax #    John Norris [phone]       [facility] [address]  [facility] [address]        Equal Access to Services     SATURNINO LIMON AH: Hadii aad ku hadasho Soomaali, waaxda luqadaha, qaybta kaalmada adeegyada, waxay idiin hayaan vega yang . So [facility] [place] [facility] [phone].    ATENCIÓN: Si habla español, tiene a sal disposición servicios gratuitos de asistencia lingüística. Llame al [phone].    We comply with applicable federal civil rights laws and [place] laws. We do not discriminate on the basis of race, color, national origin, age, disability, sex, sexual orientation, or gender identity.            Thank you!     Thank you for choosing [facility] HEPATOLOG[facility]  for your care. Our goal is always to provide you with excellent care. Hearing back from our patients is one way we can continue to improve our services. Please take a few minutes " to complete the written survey that you may receive in the mail after your visit with us. Thank you!             Your Updated Medication List - Protect others around you: Learn how to safely use, store and throw away your medicines at www.disposemymeds.org.          This list is accurate as of 4/9/18  1:55 PM.  Always use your most recent med list.                   Brand Name Dispense Instructions for use Diagnosis    ASPIRIN PO      Take 81 mg by mouth daily        atorvastatin 40 MG tablet    LIPITOR     Take 40 mg by mouth daily        MULTIVITAMIN ADULT PO      Take by mouth daily        OMEGA-3 FISH OIL PO      Take 700 mg daily        PLAVIX PO      Take 75 mg by mouth daily        RAMIPRIL PO      Take 10 mg by mouth daily

## 2018-04-09 NOTE — NURSING NOTE
"Chief Complaint   Patient presents with     RECHECK     Primary Sclerosing Cholangitis       Initial /73 (BP Location: Right arm, Patient Position: Sitting, Cuff Size: Adult Large)  Pulse 73  Temp 97.8  F (36.6  C) (Oral)  Resp 18  Ht 1.88 m (6' 2\")  Wt 91.1 kg (200 lb 12.8 oz)  SpO2 96%  BMI 25.78 kg/m2 Estimated body mass index is 25.78 kg/(m^2) as calculated from the following:    Height as of this encounter: 1.88 m (6' 2\").    Weight as of this encounter: 91.1 kg (200 lb 12.8 oz).  Medication Reconciliation: complete     Sue Griffiths [facility]  4/9/2018 1:18 PM            "

## 2018-04-26 ENCOUNTER — TRANSFERRED RECORDS (OUTPATIENT)
Dept: HEALTH INFORMATION MANAGEMENT | Facility: CLINIC | Age: 78
End: 2018-04-26

## 2018-05-04 ENCOUNTER — CARE COORDINATION (OUTPATIENT)
Dept: GASTROENTEROLOGY | Facility: CLINIC | Age: 78
End: 2018-05-04

## 2018-05-04 DIAGNOSIS — K83.1 BILE DUCT STRICTURE (H): Primary | ICD-10-CM

## 2018-05-04 NOTE — PROGRESS NOTES
Message to organize the following per Dr. Sierra:  Ava Turner and Jennie   Would you be so kind as to contact this gentleman and set him up for an ERCP; Dr Juarez will be sending him a message as well     Its for suspected bile duct stone and to give us the opportunity to resample his stricture   Thanks   Neal     Order placed for ERCP and sent to scheduling.     Patient called and is amenable to plan as noted and aware of the following:  Procedure explained to patient.  This is a more urgent procedure. He states Dr. Juarez told him this was not urgent, but if he starts having chills or fevers he needs to come more urgently to the [facility].   They can expect a call for date and time for procedure.   He would prefer this procedure to be scheduled in the end of May, beginning of June if possible.   Advised this should be fine but also advised him that if he were to have chills/fevers or nausea/vomiting he needs to call us right away.     They will need a , someone to stay with them for 24 hours and to stay in town for 24 hours post procedure, rational explained.   Will get pre-op physical done locally and will fax a copy to us along with bringing a hard copy with them. Fax number given. [phone]    Blood thinner - Plavix- advised would like him to be off for 7 days and to check in with ordering provider to make sure he doesn't need to be on any bridging medication.   ASA - yes- he will stop 7 days prior to the procedure.   Diabetic - Not diabetic.   A pre-op nurse will call 1-2 days prior to the procedure.   Is advised to be NPO/solid food at midnight before the procedure in the event the procedure time is moved up. Ok to drink clear liquids up to 4 hours prior to procedure.     Advised post procedure to start with clear liquids and advance diet slowly as tolerated.  It is normal to have a sore throat after the procedure.   May also have some muscle tenderness from positioning on the table.      Aware this RN will call within 2-3 days post procedure to see how they are doing.    Verbalized understanding of all instructions. All questions answered.   Contact information verified for future questions/concerns.       Yue TY, RN Coordinator  Dr. Juan, Dr. Sierra & Dr. Galaviz   Advanced Endoscopy  [phone]

## 2018-05-09 ENCOUNTER — CARE COORDINATION (OUTPATIENT)
Dept: GASTROENTEROLOGY | Facility: CLINIC | Age: 78
End: 2018-05-09

## 2018-05-09 NOTE — PROGRESS NOTES
Dk called in asking about the pre-op. Number given to fax to us and he is advised to bring a hard copy with him to procedure.   He will make appointment when he gets a date and time from scheduling.     Yue TY RN Coordinator  Dr. Juan, Dr. Sierra & Dr. Galaviz   [facility]  [phone]       No

## 2018-06-06 ENCOUNTER — CARE COORDINATION (OUTPATIENT)
Dept: GASTROENTEROLOGY | Facility: CLINIC | Age: 78
End: 2018-06-06

## 2018-06-06 NOTE — PROGRESS NOTES
Dk called to confirm we received his labs done on 5/21. Advised we do have them but we did not receive the actual Pre-op. He will call and have that faxed to us for our records.     Verified his appointment time with Dr. Sierra and where he will be going for procedure.     Yue TY RN Coordinator  Dr. Juan, Dr. Sierra & Dr. Galaviz   Advanced Endoscopy  [phone]

## 2018-06-11 ENCOUNTER — CARE COORDINATION (OUTPATIENT)
Dept: GASTROENTEROLOGY | Facility: CLINIC | Age: 78
End: 2018-06-11

## 2018-06-11 NOTE — OR NURSING
Received the following inipsyet message from Yue Rodrigez:    RE: Pt has a cold. Procedure tomorrow.  Received: Today       Yue Rodrigez RN Johnson, Judy, RN       Cc: Neal Sierra MD                     I called and talked to him. Denies fever, chest pain and shortness of breath. Only has a little cough at times.     He is planning on coming tomorrow.     Thank you,   Yue            Previous Messages

## 2018-06-11 NOTE — PROGRESS NOTES
"Message from PAC clinic:    Called Dk and confirmed her does not have a fever, denies chest pain and shortness of breath. He has a little \"chest cold\" but is otherwise fine.   Advised this should be fine for him for his procedure and we will see him tomorrow for his procedure.     Yue TY RN Coordinator  Dr. Juan, Dr. Sierra & Dr. Galaviz   Advanced Endoscopy  [phone]      "

## 2018-06-11 NOTE — OR NURSING
"Pre-op call done with pt who said he has a chest cold. The following in basket message was sent to was sent to Meredith Rodrigez and Dr Sierra:    Nando Harper and Dr Sierra,    This pt said he developed a \"chest cold yesterday\" He took his temp, it was 98.1 . He denies CP or SOB. He said 'It's just congestion.\" He has to drive 250 miles for his ERCP tomorrow and is worried he the procedure will be be cancelled. Can you please follow up with him?    Thanks.    Stacey Moreno  [facility]readmissions  [phone]    "

## 2018-06-12 ENCOUNTER — HOSPITAL ENCOUNTER (OUTPATIENT)
Facility: CLINIC | Age: 78
Discharge: HOME | End: 2018-06-12
Attending: INTERNAL MEDICINE | Admitting: INTERNAL MEDICINE
Payer: MEDICARE

## 2018-06-12 ENCOUNTER — SURGERY (OUTPATIENT)
Age: 78
End: 2018-06-12

## 2018-06-12 ENCOUNTER — APPOINTMENT (OUTPATIENT)
Dept: GENERAL RADIOLOGY | Facility: CLINIC | Age: 78
End: 2018-06-12
Attending: INTERNAL MEDICINE
Payer: MEDICARE

## 2018-06-12 ENCOUNTER — ANESTHESIA (OUTPATIENT)
Dept: SURGERY | Facility: CLINIC | Age: 78
End: 2018-06-12
Payer: MEDICARE

## 2018-06-12 ENCOUNTER — ANESTHESIA EVENT (OUTPATIENT)
Dept: SURGERY | Facility: CLINIC | Age: 78
End: 2018-06-12
Payer: MEDICARE

## 2018-06-12 VITALS
BODY MASS INDEX: 25.46 KG/M2 | TEMPERATURE: 98.7 F | OXYGEN SATURATION: 98 % | WEIGHT: 198.41 LBS | DIASTOLIC BLOOD PRESSURE: 87 MMHG | RESPIRATION RATE: 10 BRPM | HEIGHT: 74 IN | SYSTOLIC BLOOD PRESSURE: 125 MMHG

## 2018-06-12 DIAGNOSIS — K83.01 PRIMARY SCLEROSING CHOLANGITIS (H): Primary | ICD-10-CM

## 2018-06-12 LAB
ALBUMIN SERPL-MCNC: 3.2 G/DL (ref 3.4–5)
ALP SERPL-CCNC: 580 U/L (ref 40–150)
ALT SERPL W P-5'-P-CCNC: 51 U/L (ref 0–70)
AMYLASE SERPL-CCNC: 51 U/L (ref 30–110)
ANION GAP SERPL CALCULATED.3IONS-SCNC: 10 MMOL/L (ref 3–14)
AST SERPL W P-5'-P-CCNC: 45 U/L (ref 0–45)
BILIRUB SERPL-MCNC: 0.7 MG/DL (ref 0.2–1.3)
BUN SERPL-MCNC: 11 MG/DL (ref 7–30)
CALCIUM SERPL-MCNC: 8.6 MG/DL (ref 8.5–10.1)
CHLORIDE SERPL-SCNC: 103 MMOL/L (ref 94–109)
CO2 SERPL-SCNC: 25 MMOL/L (ref 20–32)
CREAT SERPL-MCNC: 0.71 MG/DL (ref 0.66–1.25)
ERCP: NORMAL
ERYTHROCYTE [DISTWIDTH] IN BLOOD BY AUTOMATED COUNT: 13.3 % (ref 10–15)
GFR SERPL CREATININE-BSD FRML MDRD: >90 ML/MIN/1.7M2
GLUCOSE BLDC GLUCOMTR-MCNC: 88 MG/DL (ref 70–99)
GLUCOSE SERPL-MCNC: 88 MG/DL (ref 70–99)
HCT VFR BLD AUTO: 38.5 % (ref 40–53)
HGB BLD-MCNC: 12.8 G/DL (ref 13.3–17.7)
INR PPP: 1.09 (ref 0.86–1.14)
LIPASE SERPL-CCNC: 102 U/L (ref 73–393)
MCH RBC QN AUTO: 30.3 PG (ref 26.5–33)
MCHC RBC AUTO-ENTMCNC: 33.2 G/DL (ref 31.5–36.5)
MCV RBC AUTO: 91 FL (ref 78–100)
PLATELET # BLD AUTO: 188 10E9/L (ref 150–450)
POTASSIUM SERPL-SCNC: 3.8 MMOL/L (ref 3.4–5.3)
PROT SERPL-MCNC: 7.4 G/DL (ref 6.8–8.8)
RBC # BLD AUTO: 4.23 10E12/L (ref 4.4–5.9)
SODIUM SERPL-SCNC: 138 MMOL/L (ref 133–144)
WBC # BLD AUTO: 4.9 10E9/L (ref 4–11)

## 2018-06-12 PROCEDURE — C1726 CATH, BAL DIL, NON-VASCULAR: HCPCS | Performed by: INTERNAL MEDICINE

## 2018-06-12 PROCEDURE — 80053 COMPREHEN METABOLIC PANEL: CPT | Performed by: INTERNAL MEDICINE

## 2018-06-12 PROCEDURE — A9270 NON-COVERED ITEM OR SERVICE: HCPCS | Performed by: INTERNAL MEDICINE

## 2018-06-12 PROCEDURE — 82150 ASSAY OF AMYLASE: CPT | Performed by: INTERNAL MEDICINE

## 2018-06-12 PROCEDURE — 37000008 ZZH ANESTHESIA TECHNICAL FEE, 1ST 30 MIN: Performed by: INTERNAL MEDICINE

## 2018-06-12 PROCEDURE — 36000059 ZZH SURGERY LEVEL 3 EA 15 ADDTL MIN UMMC: Performed by: INTERNAL MEDICINE

## 2018-06-12 PROCEDURE — 71000027 ZZH RECOVERY PHASE 2 EACH 15 MINS: Performed by: INTERNAL MEDICINE

## 2018-06-12 PROCEDURE — 40000277 XR SURGERY CARM FLUORO LESS THAN 5 MIN W STILLS: Mod: TC

## 2018-06-12 PROCEDURE — 25000566 ZZH SEVOFLURANE, EA 15 MIN: Performed by: INTERNAL MEDICINE

## 2018-06-12 PROCEDURE — 36000061 ZZH SURGERY LEVEL 3 W FLUORO 1ST 30 MIN - UMMC: Performed by: INTERNAL MEDICINE

## 2018-06-12 PROCEDURE — 36415 COLL VENOUS BLD VENIPUNCTURE: CPT | Performed by: INTERNAL MEDICINE

## 2018-06-12 PROCEDURE — 85027 COMPLETE CBC AUTOMATED: CPT | Performed by: INTERNAL MEDICINE

## 2018-06-12 PROCEDURE — 83690 ASSAY OF LIPASE: CPT | Performed by: INTERNAL MEDICINE

## 2018-06-12 PROCEDURE — 85610 PROTHROMBIN TIME: CPT | Performed by: INTERNAL MEDICINE

## 2018-06-12 PROCEDURE — 25000128 H RX IP 250 OP 636: Performed by: ANESTHESIOLOGY

## 2018-06-12 PROCEDURE — 25500064 ZZH RX 255 OP 636: Performed by: INTERNAL MEDICINE

## 2018-06-12 PROCEDURE — 37000009 ZZH ANESTHESIA TECHNICAL FEE, EACH ADDTL 15 MIN: Performed by: INTERNAL MEDICINE

## 2018-06-12 PROCEDURE — C9399 UNCLASSIFIED DRUGS OR BIOLOG: HCPCS | Performed by: NURSE ANESTHETIST, CERTIFIED REGISTERED

## 2018-06-12 PROCEDURE — 25000125 ZZHC RX 250: Performed by: NURSE ANESTHETIST, CERTIFIED REGISTERED

## 2018-06-12 PROCEDURE — 25000128 H RX IP 250 OP 636: Performed by: NURSE ANESTHETIST, CERTIFIED REGISTERED

## 2018-06-12 PROCEDURE — C1769 GUIDE WIRE: HCPCS | Performed by: INTERNAL MEDICINE

## 2018-06-12 PROCEDURE — 27210995 ZZH RX 272: Performed by: INTERNAL MEDICINE

## 2018-06-12 PROCEDURE — 40000170 ZZH STATISTIC PRE-PROCEDURE ASSESSMENT II: Performed by: INTERNAL MEDICINE

## 2018-06-12 PROCEDURE — 00000146 ZZHCL STATISTIC GLUCOSE BY METER IP

## 2018-06-12 PROCEDURE — 27210794 ZZH OR GENERAL SUPPLY STERILE: Performed by: INTERNAL MEDICINE

## 2018-06-12 PROCEDURE — 71000014 ZZH RECOVERY PHASE 1 LEVEL 2 FIRST HR: Performed by: INTERNAL MEDICINE

## 2018-06-12 PROCEDURE — 25000125 ZZHC RX 250: Performed by: INTERNAL MEDICINE

## 2018-06-12 RX ORDER — LIDOCAINE 40 MG/G
CREAM TOPICAL
Status: DISCONTINUED | OUTPATIENT
Start: 2018-06-12 | End: 2018-06-12 | Stop reason: HOSPADM

## 2018-06-12 RX ORDER — FENTANYL CITRATE 50 UG/ML
INJECTION, SOLUTION INTRAMUSCULAR; INTRAVENOUS PRN
Status: DISCONTINUED | OUTPATIENT
Start: 2018-06-12 | End: 2018-06-12

## 2018-06-12 RX ORDER — NALOXONE HYDROCHLORIDE 0.4 MG/ML
.1-.4 INJECTION, SOLUTION INTRAMUSCULAR; INTRAVENOUS; SUBCUTANEOUS
Status: DISCONTINUED | OUTPATIENT
Start: 2018-06-12 | End: 2018-06-12 | Stop reason: HOSPADM

## 2018-06-12 RX ORDER — PROPOFOL 10 MG/ML
INJECTION, EMULSION INTRAVENOUS PRN
Status: DISCONTINUED | OUTPATIENT
Start: 2018-06-12 | End: 2018-06-12

## 2018-06-12 RX ORDER — FENTANYL CITRATE 50 UG/ML
25-50 INJECTION, SOLUTION INTRAMUSCULAR; INTRAVENOUS
Status: DISCONTINUED | OUTPATIENT
Start: 2018-06-12 | End: 2018-06-12 | Stop reason: HOSPADM

## 2018-06-12 RX ORDER — GLYCOPYRROLATE 0.2 MG/ML
INJECTION, SOLUTION INTRAMUSCULAR; INTRAVENOUS PRN
Status: DISCONTINUED | OUTPATIENT
Start: 2018-06-12 | End: 2018-06-12

## 2018-06-12 RX ORDER — LABETALOL HYDROCHLORIDE 5 MG/ML
10 INJECTION, SOLUTION INTRAVENOUS
Status: DISCONTINUED | OUTPATIENT
Start: 2018-06-12 | End: 2018-06-12 | Stop reason: HOSPADM

## 2018-06-12 RX ORDER — IOPAMIDOL 510 MG/ML
INJECTION, SOLUTION INTRAVASCULAR PRN
Status: DISCONTINUED | OUTPATIENT
Start: 2018-06-12 | End: 2018-06-12 | Stop reason: HOSPADM

## 2018-06-12 RX ORDER — CIPROFLOXACIN 2 MG/ML
INJECTION, SOLUTION INTRAVENOUS PRN
Status: DISCONTINUED | OUTPATIENT
Start: 2018-06-12 | End: 2018-06-12

## 2018-06-12 RX ORDER — CIPROFLOXACIN 500 MG/1
500 TABLET, FILM COATED ORAL 2 TIMES DAILY
Qty: 10 TABLET | Refills: 0 | Status: SHIPPED | OUTPATIENT
Start: 2018-06-12 | End: 2018-06-17

## 2018-06-12 RX ORDER — SODIUM CHLORIDE, SODIUM LACTATE, POTASSIUM CHLORIDE, CALCIUM CHLORIDE 600; 310; 30; 20 MG/100ML; MG/100ML; MG/100ML; MG/100ML
INJECTION, SOLUTION INTRAVENOUS CONTINUOUS
Status: DISCONTINUED | OUTPATIENT
Start: 2018-06-12 | End: 2018-06-12 | Stop reason: HOSPADM

## 2018-06-12 RX ORDER — FLUMAZENIL 0.1 MG/ML
0.2 INJECTION, SOLUTION INTRAVENOUS
Status: DISCONTINUED | OUTPATIENT
Start: 2018-06-12 | End: 2018-06-12 | Stop reason: HOSPADM

## 2018-06-12 RX ORDER — LIDOCAINE HYDROCHLORIDE 20 MG/ML
INJECTION, SOLUTION INFILTRATION; PERINEURAL PRN
Status: DISCONTINUED | OUTPATIENT
Start: 2018-06-12 | End: 2018-06-12

## 2018-06-12 RX ORDER — HYDROMORPHONE HYDROCHLORIDE 1 MG/ML
.3-.5 INJECTION, SOLUTION INTRAMUSCULAR; INTRAVENOUS; SUBCUTANEOUS EVERY 10 MIN PRN
Status: DISCONTINUED | OUTPATIENT
Start: 2018-06-12 | End: 2018-06-12 | Stop reason: HOSPADM

## 2018-06-12 RX ORDER — INDOMETHACIN 50 MG/1
100 SUPPOSITORY RECTAL
Status: COMPLETED | OUTPATIENT
Start: 2018-06-12 | End: 2018-06-12

## 2018-06-12 RX ORDER — ONDANSETRON 4 MG/1
4 TABLET, ORALLY DISINTEGRATING ORAL EVERY 30 MIN PRN
Status: DISCONTINUED | OUTPATIENT
Start: 2018-06-12 | End: 2018-06-12 | Stop reason: HOSPADM

## 2018-06-12 RX ORDER — SODIUM CHLORIDE, SODIUM LACTATE, POTASSIUM CHLORIDE, CALCIUM CHLORIDE 600; 310; 30; 20 MG/100ML; MG/100ML; MG/100ML; MG/100ML
INJECTION, SOLUTION INTRAVENOUS CONTINUOUS PRN
Status: DISCONTINUED | OUTPATIENT
Start: 2018-06-12 | End: 2018-06-12

## 2018-06-12 RX ORDER — ONDANSETRON 2 MG/ML
INJECTION INTRAMUSCULAR; INTRAVENOUS PRN
Status: DISCONTINUED | OUTPATIENT
Start: 2018-06-12 | End: 2018-06-12

## 2018-06-12 RX ORDER — DIMENHYDRINATE 50 MG/ML
25 INJECTION, SOLUTION INTRAMUSCULAR; INTRAVENOUS
Status: DISCONTINUED | OUTPATIENT
Start: 2018-06-12 | End: 2018-06-12 | Stop reason: HOSPADM

## 2018-06-12 RX ORDER — ONDANSETRON 2 MG/ML
4 INJECTION INTRAMUSCULAR; INTRAVENOUS EVERY 30 MIN PRN
Status: DISCONTINUED | OUTPATIENT
Start: 2018-06-12 | End: 2018-06-12 | Stop reason: HOSPADM

## 2018-06-12 RX ADMIN — PHENYLEPHRINE HYDROCHLORIDE 50 MCG: 10 INJECTION, SOLUTION INTRAMUSCULAR; INTRAVENOUS; SUBCUTANEOUS at 14:17

## 2018-06-12 RX ADMIN — PHENYLEPHRINE HYDROCHLORIDE 100 MCG: 10 INJECTION, SOLUTION INTRAMUSCULAR; INTRAVENOUS; SUBCUTANEOUS at 14:19

## 2018-06-12 RX ADMIN — LIDOCAINE HYDROCHLORIDE 50 MG: 20 INJECTION, SOLUTION INFILTRATION; PERINEURAL at 14:01

## 2018-06-12 RX ADMIN — PHENYLEPHRINE HYDROCHLORIDE 50 MCG: 10 INJECTION, SOLUTION INTRAMUSCULAR; INTRAVENOUS; SUBCUTANEOUS at 14:01

## 2018-06-12 RX ADMIN — ONDANSETRON 4 MG: 2 INJECTION INTRAMUSCULAR; INTRAVENOUS at 14:27

## 2018-06-12 RX ADMIN — SODIUM CHLORIDE, POTASSIUM CHLORIDE, SODIUM LACTATE AND CALCIUM CHLORIDE: 600; 310; 30; 20 INJECTION, SOLUTION INTRAVENOUS at 13:50

## 2018-06-12 RX ADMIN — INDOMETHACIN 100 MG: 50 SUPPOSITORY RECTAL at 14:36

## 2018-06-12 RX ADMIN — ROCURONIUM BROMIDE 50 MG: 10 INJECTION INTRAVENOUS at 14:01

## 2018-06-12 RX ADMIN — PROPOFOL 130 MG: 10 INJECTION, EMULSION INTRAVENOUS at 14:01

## 2018-06-12 RX ADMIN — IOPAMIDOL 20 ML: 510 INJECTION, SOLUTION INTRAVASCULAR at 14:36

## 2018-06-12 RX ADMIN — FENTANYL CITRATE 25 MCG: 50 INJECTION INTRAMUSCULAR; INTRAVENOUS at 15:02

## 2018-06-12 RX ADMIN — FENTANYL CITRATE 75 MCG: 50 INJECTION, SOLUTION INTRAMUSCULAR; INTRAVENOUS at 14:01

## 2018-06-12 RX ADMIN — GLYCOPYRROLATE 0.2 MG: 0.2 INJECTION, SOLUTION INTRAMUSCULAR; INTRAVENOUS at 14:01

## 2018-06-12 RX ADMIN — WATER 100 ML: 100 IRRIGANT IRRIGATION at 13:40

## 2018-06-12 RX ADMIN — SIMETHICONE 133 MG: 63.3; 3.7 SOLUTION/ DROPS ORAL at 13:40

## 2018-06-12 RX ADMIN — CIPROFLOXACIN 400 MG: 2 INJECTION INTRAVENOUS at 14:10

## 2018-06-12 RX ADMIN — SUGAMMADEX 200 MG: 100 INJECTION, SOLUTION INTRAVENOUS at 14:36

## 2018-06-12 NOTE — IP AVS SNAPSHOT
MRN:[id number]                      After Visit Summary   6/12/2018    Dk Owens    MRN: [id number]           Thank you!     Thank you for choosing [facility] for your care. Our goal is always to provide you with excellent care. Hearing back from our patients is one way we can continue to improve our services. Please take a few minutes to complete the written survey that you may receive in the mail after you visit with us. Thank you!        Patient Information     Date Of Birth          1940        About your hospital stay     You were admitted on:  June 12, 2018 You last received care in the:  Post Anesthesia Care Unit [facility]    You were discharged on:  June 12, 2018       Who to Call     For medical emergencies, please call 911.  For non-urgent questions about your medical care, please call your primary care provider or clinic, [phone]  For questions related to your surgery, please call your surgery clinic        Attending Provider     Provider Neal Griffin MD Gastroenterology       Primary Care Provider Office Phone # Fax #    John Norris [phone]      After Care Instructions     Discharge Instructions       Resume pre procedure diet            Discharge Instructions       Restart home medications.                  Your next 10 appointments already scheduled     Apr 08, 2019 12:30 PM CDT   Lab with  LAB   [facility] ([facility])    [address]  1st Floor  [facility] [facility] [address] [phone]            Apr 08, 2019  1:30 PM CDT   (Arrive by 1:15 PM)   Return General Liver with Ren Hazel MD   [facility] ([facility])    [address] [place] [address]  [address] [place] [address] [phone]              Further instructions from your care team       Dr. Sierra's Discharge Instructions    Recommendation:                            - Complete a short  course of antibiotic as prescribed                        - No plans for interval ERCP at this juncture                        - Follow up with established physicians as scheduled   [facility] [place]  Same-Day Surgery   Adult Discharge Orders & Instructions     For 24 hours after surgery    1. Get plenty of rest.  A responsible adult must stay with you for at least 24 hours after you leave the hospital.   2. Do not drive or use heavy equipment.  If you have weakness or tingling, don't drive or use heavy equipment until this feeling goes away.  3. Do not drink alcohol.  4. Avoid strenuous or risky activities.  Ask for help when climbing stairs.   5. You may feel lightheaded.  IF so, sit for a few minutes before standing.  Have someone help you get up.   6. If you have nausea (feel sick to your stomach): Drink only clear liquids such as apple juice, ginger ale, broth or 7-Up.  Rest may also help.  Be sure to drink enough fluids.  Move to a regular diet as you feel able.  7. You may have a slight fever. Call the doctor if your fever is over 100 F (37.7 C) (taken under the tongue) or lasts longer than 24 hours.  8. You may have a dry mouth, a sore throat, muscle aches or trouble sleeping.  These should go away after 24 hours.  9. Do not make important or legal decisions.   Call your doctor for any of the followin.  Signs of infection (fever, growing tenderness at the surgery site, a large amount of drainage or bleeding, severe pain, foul-smelling drainage, redness, swelling).    2. It has been over 8 to 10 hours since surgery and you are still not able to urinate (pass water).    3.  Headache for over 24 hours.      To contact a doctor, call Dr. [facility] Phone number [phone]. During regular business hours or:        [phone] and ask for the resident on call for Gastroenterology  (answered 24 hours a day)      Emergency Department:    [facility]:  "[phone]       (TTY for hearing impaired: [phone])           (TTY for hearing impaired: [phone])                                 Pending Results     No orders found from 6/10/2018 to 6/13/2018.            Admission Information     Date & Time Provider Department Dept. Phone    6/12/2018 Neal Sierra MD Post Anesthesia Care Unit [facility] [phone]      Your Vitals Were     Blood Pressure Temperature Respirations Height Weight Pulse Oximetry    131/76 98.2  F (36.8  C) (Oral) 14 1.88 m (6' 2\") 90 kg (198 lb 6.6 oz) 96%    BMI (Body Mass Index)                   25.47 kg/m2           Learn with [employer] Information     [employer] gives you secure access to your electronic health record. If you see a primary care provider, you can also send messages to your care team and make appointments. If you have questions, please call your primary care clinic.  If you do not have a primary care provider, please call [phone] and they will assist you.        Care EveryWhere ID     This is your Care EveryWhere ID. This could be used by other organizations to access your [place] medical records  [id number]        Equal Access to Services     SATURNINO LIMON AH: Kareem Howell, waelgin resendez, qaybta kaalmada tarik, junaid eden. So [facility] [phone].    ATENCIÓN: Si habla español, tiene a sal disposición servicios gratuitos de asistencia lingüística. Llame al [phone].    We comply with applicable federal civil rights laws and [place] laws. We do not discriminate on the basis of race, color, national origin, age, disability, sex, sexual orientation, or gender identity.               Review of your medicines      START taking        Dose / Directions    ciprofloxacin 500 MG tablet   Commonly known as:  CIPRO   Used for:  Primary sclerosing cholangitis        Dose:  500 mg   Take 1 tablet (500 mg) by mouth 2 times daily for 5 days   Quantity:  10 tablet "   Refills:  0         CONTINUE these medicines which have NOT CHANGED        Dose / Directions    ASPIRIN PO        Dose:  81 mg   Take 81 mg by mouth daily   Refills:  0       atorvastatin 40 MG tablet   Commonly known as:  LIPITOR        Dose:  40 mg   Take 40 mg by mouth daily   Refills:  0       MULTIVITAMIN ADULT PO        Take by mouth daily   Refills:  0       OMEGA-3 FISH OIL PO        Take 700 mg daily   Refills:  0       PLAVIX PO        Dose:  75 mg   Take 75 mg by mouth daily   Refills:  0       RAMIPRIL PO        Dose:  10 mg   Take 10 mg by mouth every evening   Refills:  0            Where to get your medicines      These medications were sent to [facility] - [place], [place] - [address]  [address] [facility] [address]     Phone:  [phone]     ciprofloxacin 500 MG tablet                Protect others around you: Learn how to safely use, store and throw away your medicines at www.disposemymeds.org.        ANTIBIOTIC INSTRUCTION     You've Been Prescribed an Antibiotic - Now What?  Your healthcare team thinks that you or your loved one might have an infection. Some infections can be treated with antibiotics, which are powerful, life-saving drugs. Like all medications, antibiotics have side effects and should only be used when necessary. There are some important things you should know about your antibiotic treatment.      Your healthcare team may run tests before you start taking an antibiotic.    Your team may take samples (e.g., from your blood, urine or other areas) to run tests to look for bacteria. These test can be important to determine if you need an antibiotic at all and, if you do, which antibiotic will work best.      Within a few days, your healthcare team might change or even stop your antibiotic.    Your team may start you on an antibiotic while they are working to find out what is making you sick.    Your team might change your antibiotic because  test results show that a different antibiotic would be better to treat your infection.    In some cases, once your team has more information, they learn that you do not need an antibiotic at all. They may find out that you don't have an infection, or that the antibiotic you're taking won't work against your infection. For example, an infection caused by a virus can't be treated with antibiotics. Staying on an antibiotic when you don't need it is more likely to be harmful than helpful.      You may experience side effects from your antibiotic.    Like all medications, antibiotics have side effects. Some of these can be serious.    Let you healthcare team know if you have any known allergies when you are admitted to the hospital.    One significant side effect of nearly all antibiotics is the risk of severe and sometimes deadly diarrhea caused by Clostridium difficile (C. Difficile). This occurs when a person takes antibiotics because some good germs are destroyed. Antibiotic use allows C. diificile to take over, putting patients at high risk for this serious infection.    As a patient or caregiver, it is important to understand your or your loved one's antibiotic treatment. It is especially important for caregivers to speak up when patients can't speak for themselves. Here are some important questions to ask your healthcare team.    What infection is this antibiotic treating and how do you know I have that infection?    What side effects might occur from this antibiotic?    How long will I need to take this antibiotic?    Is it safe to take this antibiotic with other medications or supplements (e.g., vitamins) that I am taking?     Are there any special directions I need to know about taking this antibiotic? For example, should I take it with food?    How will I be monitored to know whether my infection is responding to the antibiotic?    What tests may help to make sure the right antibiotic is prescribed for  me?      Information provided by:  www.cdc.gov/getsmart  U.S. Department of Health and Human Services  Centers for disease Control and Prevention  National Center for Emerging and Zoonotic Infectious Diseases  Division of Healthcare Quality Promotion             Medication List: This is a list of all your medications and when to take them. Check marks below indicate your daily home schedule. Keep this list as a reference.      Medications           Morning Afternoon Evening Bedtime As Needed    ASPIRIN PO   Take 81 mg by mouth daily                                atorvastatin 40 MG tablet   Commonly known as:  LIPITOR   Take 40 mg by mouth daily                                ciprofloxacin 500 MG tablet   Commonly known as:  CIPRO   Take 1 tablet (500 mg) by mouth 2 times daily for 5 days                                MULTIVITAMIN ADULT PO   Take by mouth daily                                OMEGA-3 FISH OIL PO   Take 700 mg daily                                PLAVIX PO   Take 75 mg by mouth daily                                RAMIPRIL PO   Take 10 mg by mouth every evening

## 2018-06-12 NOTE — ANESTHESIA POSTPROCEDURE EVALUATION
Patient: Dk Owens    Procedure(s):  Endoscopic Retrograde Cholangiopancreatogram with debris removal - Wound Class: II-Clean Contaminated    Diagnosis:Pancreatic Duct Stone   Diagnosis Additional Information: No value filed.    Anesthesia Type:  General    Note:  Anesthesia Post Evaluation    Patient location during evaluation: PACU  Patient participation: Able to fully participate in evaluation  Level of consciousness: sleepy but conscious  Pain management: adequate  Airway patency: patent  Cardiovascular status: acceptable  Respiratory status: acceptable  Hydration status: acceptable  PONV: controlled     Anesthetic complications: None    Comments: Patient awake to voice, clear a/w.  Stable VS.  No new or current issues evident at this time.  OK out per PACU protocol.  --rcp          Last vitals:  Vitals:    06/12/18 1122   BP: 121/72   Resp: 16   Temp: 36.8  C (98.2  F)   SpO2: 97%         Electronically Signed By: Bereket Coronado MD  June 12, 2018  2:51 PM

## 2018-06-12 NOTE — ANESTHESIA PREPROCEDURE EVALUATION
ANESTHESIA PREOP EVALUATION    NPO Status:      Procedure: ERCP    HPI:      PMHx/PSHx/ROS:  PAST MEDICAL HISTORY:   Past Medical History:   Diagnosis Date     Coronary artery disease      Hyperlipidemia      Nephrolithiasis      Osteoarthritis      Primary sclerosing cholangitis      Prostate cancer (H) 2006       PAST SURGICAL HISTORY:   Past Surgical History:   Procedure Laterality Date     BACK SURGERY  1987     ENDOSCOPIC RETROGRADE CHOLANGIOPANCREATOGRAM N/A 1/26/2016    Procedure: ENDOSCOPIC RETROGRADE CHOLANGIOPANCREATOGRAM;  Surgeon: Neal Sierra MD;  Location: [facility] OR     ENDOSCOPIC RETROGRADE CHOLANGIOPANCREATOGRAM N/A 3/29/2016    Procedure: COMBINED ENDOSCOPIC RETROGRADE CHOLANGIOPANCREATOGRAPHY, REMOVE FOREIGN BODY OR STENT/TUBE;  Surgeon: Neal Sierra MD;  Location: [facility] OR     ESOPHAGOSCOPY, GASTROSCOPY, DUODENOSCOPY (EGD), COMBINED N/A 1/26/2016    Procedure: COMBINED ENDOSCOPIC ULTRASOUND, ESOPHAGOSCOPY, GASTROSCOPY, DUODENOSCOPY (EGD), FINE NEEDLE ASPIRATE/BIOPSY;  Surgeon: Neal Sierra MD;  Location: [facility] OR     h/o lithotripsy procedure       PROSTATECTOMY SUPRAPUBIC  2006       FAMILY HISTORY:   Family History   Problem Relation Age of Onset     Coronary Artery Disease Father      Prostate Cancer Father      Liver Disease No family hx of      Inflammatory Bowel Disease No family hx of          Past Anes Hx: No personal or family h/o anesthesia problems    Soc Hx:   Tobacco:   EtOH:     Allergies: No Known Allergies    Meds:   Prescriptions Prior to Admission   Medication Sig Dispense Refill Last Dose     atorvastatin (LIPITOR) 40 MG tablet Take 40 mg by mouth daily   6/12/2018 at AM     Multiple Vitamins-Minerals (MULTIVITAMIN ADULT PO) Take by mouth daily   6/11/2018 at AM     Omega-3 Fatty Acids (OMEGA-3 FISH OIL PO) Take 700 mg daily   6/11/2018 at AM     RAMIPRIL PO Take 10 mg by mouth every evening    6/11/2018 at PM     ASPIRIN PO Take 81 mg by mouth daily    6/5/2018     Clopidogrel Bisulfate (PLAVIX PO) Take 75 mg by mouth daily   6/5/2018       No current outpatient prescriptions on file.       Physical Exam:  VS: T 98.2, P Data Unavailable, /72, R 16, SpO2 97%     Airway: MP 2, TM>3FB, Neck full ROM  Dentition: no loose teeth  Heart: RRR  Lungs: CTAB      BMP:  Lab Results   Component Value Date     06/12/2018      Lab Results   Component Value Date    POTASSIUM 3.8 06/12/2018     Lab Results   Component Value Date    CHLORIDE 103 06/12/2018     Lab Results   Component Value Date    RITA 8.6 06/12/2018     Lab Results   Component Value Date    CO2 25 06/12/2018     Lab Results   Component Value Date    BUN 11 06/12/2018     Lab Results   Component Value Date    CR 0.71 06/12/2018     Lab Results   Component Value Date    GLC 88 06/12/2018        CBC:  Lab Results   Component Value Date    WBC 4.9 06/12/2018     Lab Results   Component Value Date    HGB 12.8 06/12/2018     Lab Results   Component Value Date    HCT 38.5 06/12/2018     Lab Results   Component Value Date     06/12/2018        Coags/Type and Screen  Lab Results   Component Value Date    INR 1.09 06/12/2018     No results found for: PT  Type and Screen:      Assessment/Plan:  - ASA 3  - GETA with standard ASA monitors, IV induction, balanced anesthetic  - PIV  - Antibiotics per surgery  - PONV prophylaxis  - Blood products    Bereket Coronado MD    6/12/2018  1:11 PM                        Anesthesia Plan      History & Physical Review  History and physical reviewed and following examination; no interval change.    ASA Status:  3 .    NPO Status:  > 4 hours    Plan for General with Intravenous induction. Maintenance will be Balanced.    PONV prophylaxis:  Ondansetron (or other 5HT-3), Dexamethasone or Solumedrol and Other (See comment)  History and physical assessed; Patient examined.  I have reviewed and agree with this pre-op assessment and anesthetic plan.  Patient and family also  agree.   Risks and alternatives presented and discussed.   AQA.  -rcp        Postoperative Care  Postoperative pain management:  IV analgesics.      Consents  Anesthetic plan, risks, benefits and alternatives discussed with:  Patient.  Use of blood products discussed: No .   .                          .

## 2018-06-12 NOTE — ANESTHESIA CARE TRANSFER NOTE
Patient: Dk Owens    Procedure(s):  Endoscopic Retrograde Cholangiopancreatogram with debris removal - Wound Class: II-Clean Contaminated    Diagnosis: Pancreatic Duct Stone   Diagnosis Additional Information: No value filed.    Anesthesia Type:   General     Note:  Airway :Face Mask  Patient transferred to:PACU  Comments: VSS.  Report given to RN.Handoff Report: Identifed the Patient, Identified the Reponsible Provider, Reviewed the pertinent medical history, Discussed the surgical course, Reviewed Intra-OP anesthesia mangement and issues during anesthesia, Set expectations for post-procedure period and Allowed opportunity for questions and acknowledgement of understanding      Vitals: (Last set prior to Anesthesia Care Transfer)    CRNA VITALS  6/12/2018 1411 - 6/12/2018 1447      6/12/2018             Pulse: 77    Ht Rate: 78    SpO2: 100 %    Resp Rate (observed): 8                Electronically Signed By: STONEY Garsia CRNA  June 12, 2018  2:47 PM

## 2018-06-12 NOTE — OP NOTE
ERCP 06/12/2018  2:14 PM [facility], [address] [place] [address] [phone]     Endoscopy Department   _______________________________________________________________________________   Patient Name: Dk Owens            Procedure Date: 6/12/2018 2:14 PM   MRN: [id number]                       Account Number: [id number]   YOB: 1940              Admit Type: Outpatient   Age: 78                               Room: OR   Gender: Male                          Note Status: Finalized   Attending MD: Neal Sierra MD   Total Sedation Time:   _______________________________________________________________________________       Procedure:           ERCP   Indications:         Abnormal MRCP, Suspected bile duct stone(s), Primary                        sclerosing cholangitis   Providers:           Neal Sierra MD   Patient Profile:     Mr Owens is a 77yo gentleman with a diagnosis of                        primary sclerosing cholangitis (minimal change) who is                        found to have a suspected common duct stone on MRCP and                        now proceeds to ERCP for management.   Referring MD:        Ren Juarez MD   Medicines:           General Anesthesia, Cipro 400 mg IV, Indomethacin 100 mg                        NC   Complications:       No immediate complications.   _______________________________________________________________________________   Procedure:           Pre-Anesthesia Assessment:                        - Prior to the procedure, a History and Physical was                        performed, and patient medications and allergies were                        reviewed. The patient is competent. The risks and                        benefits of the procedure and the sedation options and                        risks were discussed with the patient. All questions                        were answered and informed consent was  obtained. Patient                        identification and proposed procedure were verified by                        the nurse in the pre-procedure area. Mental Status                        Examination: alert and oriented. Airway Examination:                        Mallampati Class II (the uvula but not tonsillar pillars                        visualized). Respiratory Examination: clear to                        auscultation. CV Examination: normal. ASA Grade                        Assessment: II - A patient with mild systemic disease.                        After reviewing the risks and benefits, the patient was                        deemed in satisfactory condition to undergo the                        procedure. The anesthesia plan was to use general                        anesthesia. Immediately prior to administration of                        medications, the patient was re-assessed for adequacy to                        receive sedatives. The heart rate, respiratory rate,                        oxygen saturations, blood pressure, adequacy of                        pulmonary ventilation, and response to care were                        monitored throughout the procedure. The physical status                        of the patient was re-assessed after the procedure.                        After obtaining informed consent, the scope was passed                        under direct vision. Throughout the procedure, the                        patient's blood pressure, pulse, and oxygen saturations                        were monitored continuously. The duodenoscope was                        introduced through the mouth, and used to inject                        contrast into and used to inject contrast into the bile                        duct. The ERCP was accomplished without difficulty. The                        patient tolerated the procedure well.                                                              "                        Findings:        A  film of the right upper quadrant was unremarkable. Limited views        of the foregut were notable for a widely patent biliary sphincterotomy.        The bile duct was deeply cannulated with the short-nosed traction        sphincterotome (and later the 12 mm balloon) in concert with an 0.025\"        Visiglide wire. Contrast was injected and I personally interpreted the        bile duct images. Ductal flow of contrast was adequate, image quality        was excellent and contrast extended throughout the intrahepatic ducts.        While the peripheral intrahepatics were diffusely thin and short there        was minimal overt evidence of a clinically signficant intrahepatic        steonsis, all without upstream dilation. The bifurcation was        decompressed as was the the length of the common. There were small        irregular filling defects within the distal common. Drainage de jayro was        excellent. The biliary tree was swept with a 12 mm balloon starting at        the left intrahepatic duct(s) and right intrahepatic duct(s). Debris        (small stone fragments) was swept clear from the duct. The biliary        system was then irrigated with 20cc saline. The ventral pancreatic duct        was selectively not interrogated.                                                                                     Impression:          - Patent biliary sphincterotomy                        - Minimal evidence of stricturing disease within the                        intrahepatics                        - Unremarkable extrahepatics save for a small amount of                        debris within the common which as swept clear   Recommendation:      - Standard outpatient general anesthesia recovery with                        probable discharge home this afternoon                        - Complete a short course of antibiotic as prescribed                        - No plans " for interval ERCP at this juncture                        - Follow up with established physicians as scheduled                        - The findings and recommendations were discussed with                        the patient and their family                                                                                       electronically signed by ORLANDO Sierra   _____________________   Neal Sierra MD   6/12/2018 2:42:08 PM   I was physically present for the entire viewing portion of the exam.   __________________________   Signature of teaching physician   Ash/[id number] Sierra MD   Number of Addenda: 0

## 2018-06-12 NOTE — IP AVS SNAPSHOT
Post Anesthesia Care Unit [address] [address]    Phone:  [phone]                                       After Visit Summary   6/12/2018    Dk Roman    MRN: [id number]           After Visit Summary Signature Page     I have received my discharge instructions, and my questions have been answered. I have discussed any challenges I see with this plan with the nurse or doctor.    ..........................................................................................................................................  Patient/Patient Representative Signature      ..........................................................................................................................................  Patient Representative Print Name and Relationship to Patient    ..................................................               ................................................  Date                                            Time    ..........................................................................................................................................  Reviewed by Signature/Title    ...................................................              ..............................................  Date                                                            Time

## 2018-06-12 NOTE — DISCHARGE INSTRUCTIONS
Dr. Sierra's Discharge Instructions    Recommendation:                            - Complete a short course of antibiotic as prescribed                        - No plans for interval ERCP at this juncture                        - Follow up with established physicians as scheduled   [facility]  Same-Day Surgery   Adult Discharge Orders & Instructions     For 24 hours after surgery    1. Get plenty of rest.  A responsible adult must stay with you for at least 24 hours after you leave the hospital.   2. Do not drive or use heavy equipment.  If you have weakness or tingling, don't drive or use heavy equipment until this feeling goes away.  3. Do not drink alcohol.  4. Avoid strenuous or risky activities.  Ask for help when climbing stairs.   5. You may feel lightheaded.  IF so, sit for a few minutes before standing.  Have someone help you get up.   6. If you have nausea (feel sick to your stomach): Drink only clear liquids such as apple juice, ginger ale, broth or 7-Up.  Rest may also help.  Be sure to drink enough fluids.  Move to a regular diet as you feel able.  7. You may have a slight fever. Call the doctor if your fever is over 100 F (37.7 C) (taken under the tongue) or lasts longer than 24 hours.  8. You may have a dry mouth, a sore throat, muscle aches or trouble sleeping.  These should go away after 24 hours.  9. Do not make important or legal decisions.   Call your doctor for any of the followin.  Signs of infection (fever, growing tenderness at the surgery site, a large amount of drainage or bleeding, severe pain, foul-smelling drainage, redness, swelling).    2. It has been over 8 to 10 hours since surgery and you are still not able to urinate (pass water).    3.  Headache for over 24 hours.      To contact a doctor, call [facility] Phone number [phone]. During regular business hours or:        [phone] and ask for the resident on call for  Gastroenterology  (answered 24 hours a day)      Emergency Department:    [facility]: [phone]       (TTY for hearing impaired: [phone])           (TTY for hearing impaired: [phone])

## 2018-06-14 ENCOUNTER — CARE COORDINATION (OUTPATIENT)
Dept: GASTROENTEROLOGY | Facility: CLINIC | Age: 78
End: 2018-06-14

## 2018-06-14 NOTE — PROGRESS NOTES
Post ERCP (6/12/2018) with Dr. Sierra: Follow-up    Post procedure recommendations:  - Complete a short course of antibiotic as prescribed   - No plans for interval ERCP at this juncture   - Follow up with established physicians as scheduled     Orders placed: None at this time.     Message left for Dk to call with any questions/concerns.     Clinic contact and scheduling numbers verified for future questions/concerns.     Yue TY RN Coordinator  Dr. Juan, Dr. Sierra & Dr. Galaviz  [facility]  [phone]

## 2018-11-15 ENCOUNTER — HOSPITAL ENCOUNTER (EMERGENCY)
Dept: HOSPITAL 38 - CC.ED | Age: 78
Discharge: HOME | End: 2018-11-15
Payer: MEDICARE

## 2018-11-15 DIAGNOSIS — K06.8: Primary | ICD-10-CM

## 2018-11-15 DIAGNOSIS — I25.2: ICD-10-CM

## 2018-11-15 DIAGNOSIS — Z79.899: ICD-10-CM

## 2018-11-15 DIAGNOSIS — Z79.82: ICD-10-CM

## 2018-11-15 DIAGNOSIS — Z95.5: ICD-10-CM

## 2018-11-15 NOTE — EDM.PDOC
ED HPI GENERAL MEDICAL PROBLEM





- General


Chief Complaint: General


Stated Complaint: bleeding from mouth


Time Seen by Provider: 11/15/18 20:30


Source of Information: Reports: Patient


History Limitations: Reports: No Limitations





- History of Present Illness


INITIAL COMMENTS - FREE TEXT/NARRATIVE: 





approximately 15:30 noted some blood from mouth then it quit.  About 17:30 

noted that it started again and it would not stop.  Tried to put pressure on it 

with paper towel but it didn't quit.  Came into ER at about 2000 and it was 

still bleeding.  Denies any trauma to the area.  Denies scraping the area with 

anything.  Did have dental check up about 2 weeks ago and all was good.  Denies 

any pain or pain to teeth.


When I arrived he has been biting on piece of gauze and bleeding has stopped at 

this time.


Onset: Sudden


Location: Reports: Other


Associated Symptoms: Reports: No Other Symptoms





- Related Data


 Allergies











Allergy/AdvReac Type Severity Reaction Status Date / Time


 


No Known Allergies Allergy   Verified 11/15/18 20:27











Home Meds: 


 Home Meds





Aspirin [Children's Aspirin] 81 mg PO DAILY 12/04/17 [History]


Clopidogrel Bisulfate [Clopidogrel] 75 mg PO DAILY 12/04/17 [History]


Multivitamin [Multi-Day Vitamins] 1 each PO DAILY 12/04/17 [History]


Omega-3S/DHA/Epa/Fish Oil [Fish Oil Omega-3 Softgel] 3 each PO DAILY 12/04/17 [

History]


Ramipril 10 mg PO DAILY 12/04/17 [History]


Rosuvastatin [Crestor] 10 mg PO DAILY 11/15/18 [History]











Past Medical History


HEENT History: Reports: Impaired Vision, Other (See Below)


Other HEENT History: GLASSES


Cardiovascular History: Reports: MI, Stents


Musculoskeletal History: Reports: Arthritis


Oncologic (Cancer) History: Reports: Basal Cell Carcinoma, Prostate





- Past Surgical History


Cardiovascular Surgical History: Reports: Coronary Artery Stent


GI Surgical History: Reports: ERCP


Male  Surgical History: Reports: Prostatectomy


Other Musculoskeletal Surgeries/Procedures:: back surgery





Social & Family History





- Family History


Family Medical History: Noncontributory





- Tobacco Use


Smoking Status *Q: Never Smoker





- Caffeine Use


Caffeine Use: Reports: None





ED ROS GENERAL





- Review of Systems


Review Of Systems: See Below


Constitutional: Reports: No Symptoms


HEENT: Reports: Other (bleeding from mouth)


Respiratory: Reports: No Symptoms


Cardiovascular: Reports: No Symptoms





ED EXAM, GENERAL





- Physical Exam


Exam: See Below


Exam Limited By: No Limitations


General Appearance: Alert, WD/WN, No Apparent Distress


Throat/Mouth: Normal Inspection, Other (behind the front two teeth he has blood 

noted that is now stopped.  There is no active bleeding noted.  Can see where 

the bleeding had been.  Teeth are not lose and he states that it is a bridge.  

No other bleeding noted.)


Neurological: Alert, Oriented


Psychiatric: Normal Affect


Skin Exam: Warm, Dry, Intact





Course





- Vital Signs


Last Recorded V/S: 


 Last Vital Signs











Temp  98.1 F   11/15/18 20:31


 


Pulse  74   11/15/18 20:31


 


Resp  18   11/15/18 20:31


 


BP  140/72   11/15/18 20:31


 


Pulse Ox  96   11/15/18 20:31














Departure





- Departure


Time of Disposition: 20:47


Disposition: Home, Self-Care 01


Condition: Good


Clinical Impression: 


 Gums, bleeding








- Discharge Information


*PRESCRIPTION DRUG MONITORING PROGRAM REVIEWED*: No


*COPY OF PRESCRIPTION DRUG MONITORING REPORT IN PATIENT BRANDON: No


Instructions:  Gingivitis


Referrals: 


Sunil Motta MD [Primary Care Provider] - 


Forms:  ED Department Discharge


Additional Instructions: 


continue to apply pressure to teeth by biting on the gauze.


Drink cold fluids or suck on ice.


Do not eat anything tonight that requires you to take a bite


Need to visit with dentist as the bleeding is coming from gums under bridge








- Problem List & Annotations


(1) Gums, bleeding


SNOMED Code(s): 37770907


   Code(s): K06.8 - OT DISRD OF GINGIVA AND EDENTULOUS ALVEOLAR RIDGE   Status

: Acute   Priority: High   Current Visit: Yes   





- Problem List Review


Problem List Initiated/Reviewed/Updated: Yes

## 2020-03-10 ENCOUNTER — HEALTH MAINTENANCE LETTER (OUTPATIENT)
Age: 80
End: 2020-03-10

## 2020-12-27 ENCOUNTER — HEALTH MAINTENANCE LETTER (OUTPATIENT)
Age: 80
End: 2020-12-27

## 2021-04-24 ENCOUNTER — HEALTH MAINTENANCE LETTER (OUTPATIENT)
Age: 81
End: 2021-04-24

## 2021-10-04 ENCOUNTER — HEALTH MAINTENANCE LETTER (OUTPATIENT)
Age: 81
End: 2021-10-04

## 2022-05-15 ENCOUNTER — HEALTH MAINTENANCE LETTER (OUTPATIENT)
Age: 82
End: 2022-05-15

## 2022-05-31 ENCOUNTER — HOSPITAL ENCOUNTER (EMERGENCY)
Dept: HOSPITAL 38 - CC.ED | Age: 82
Discharge: HOME | End: 2022-05-31
Payer: MEDICARE

## 2022-05-31 DIAGNOSIS — M25.532: Primary | ICD-10-CM

## 2022-05-31 DIAGNOSIS — Z79.899: ICD-10-CM

## 2022-05-31 DIAGNOSIS — Z95.5: ICD-10-CM

## 2022-05-31 DIAGNOSIS — Z79.02: ICD-10-CM

## 2022-05-31 DIAGNOSIS — Z79.82: ICD-10-CM

## 2022-05-31 DIAGNOSIS — I25.2: ICD-10-CM

## 2022-09-11 ENCOUNTER — HEALTH MAINTENANCE LETTER (OUTPATIENT)
Age: 82
End: 2022-09-11

## 2023-06-03 ENCOUNTER — HEALTH MAINTENANCE LETTER (OUTPATIENT)
Age: 83
End: 2023-06-03

## (undated) DEVICE — ENDO TUBING CO2 SMARTCAP STERILE DISP 100145CO2EXT

## (undated) DEVICE — ENDO FUSION OMNI-TOME G31903

## (undated) DEVICE — PACK ENDOSCOPY GI CUSTOM UMMC

## (undated) DEVICE — TAPE CLOTH 3" CARDINAL 3TRCL03

## (undated) DEVICE — WIRE GUIDE 0.025"X270CM STR VISIGLIDE G-240-2527S

## (undated) DEVICE — SOL WATER IRRIG 1000ML BOTTLE 2F7114

## (undated) DEVICE — KIT CONNECTOR FOR OLYMPUS ENDOSCOPES DEFENDO 100310

## (undated) DEVICE — ENDO BITE BLOCK ADULT OMNI-BLOC

## (undated) DEVICE — ENDO DEVICE LOCKING AND BIOPSY CAP M00545261

## (undated) DEVICE — BIOPSY VALVE BIOSHIELD 00711135

## (undated) DEVICE — CATH RETRIEVAL BALLOON EXTRACTOR PRO RX-S INJ ABOVE 9-12MM

## (undated) DEVICE — KIT ENDO FIRST STEP DISINFECTANT 200ML W/POUCH EP-4

## (undated) RX ORDER — PROPOFOL 10 MG/ML
INJECTION, EMULSION INTRAVENOUS
Status: DISPENSED
Start: 2018-06-12

## (undated) RX ORDER — FENTANYL CITRATE 50 UG/ML
INJECTION, SOLUTION INTRAMUSCULAR; INTRAVENOUS
Status: DISPENSED
Start: 2018-06-12

## (undated) RX ORDER — ONDANSETRON 2 MG/ML
INJECTION INTRAMUSCULAR; INTRAVENOUS
Status: DISPENSED
Start: 2018-06-12

## (undated) RX ORDER — INDOMETHACIN 50 MG/1
SUPPOSITORY RECTAL
Status: DISPENSED
Start: 2018-06-12

## (undated) RX ORDER — GLYCOPYRROLATE 0.2 MG/ML
INJECTION, SOLUTION INTRAMUSCULAR; INTRAVENOUS
Status: DISPENSED
Start: 2018-06-12